# Patient Record
Sex: MALE | Employment: FULL TIME | URBAN - METROPOLITAN AREA
[De-identification: names, ages, dates, MRNs, and addresses within clinical notes are randomized per-mention and may not be internally consistent; named-entity substitution may affect disease eponyms.]

---

## 2017-03-17 ENCOUNTER — OFFICE VISIT (OUTPATIENT)
Dept: CARDIOLOGY CLINIC | Age: 57
End: 2017-03-17

## 2017-03-17 VITALS
SYSTOLIC BLOOD PRESSURE: 130 MMHG | BODY MASS INDEX: 34.69 KG/M2 | WEIGHT: 256.12 LBS | HEIGHT: 72 IN | HEART RATE: 95 BPM | DIASTOLIC BLOOD PRESSURE: 82 MMHG

## 2017-03-17 DIAGNOSIS — E78.5 HYPERLIPIDEMIA, UNSPECIFIED HYPERLIPIDEMIA TYPE: ICD-10-CM

## 2017-03-17 DIAGNOSIS — I10 ESSENTIAL HYPERTENSION: ICD-10-CM

## 2017-03-17 DIAGNOSIS — R94.39 ABNORMAL STRESS TEST: ICD-10-CM

## 2017-03-17 DIAGNOSIS — E10.9 TYPE 1 DIABETES MELLITUS WITHOUT COMPLICATION (HCC): ICD-10-CM

## 2017-03-17 DIAGNOSIS — I44.7 LBBB (LEFT BUNDLE BRANCH BLOCK): Primary | ICD-10-CM

## 2017-03-17 PROCEDURE — 99204 OFFICE O/P NEW MOD 45 MIN: CPT | Performed by: INTERNAL MEDICINE

## 2017-03-17 PROCEDURE — 93000 ELECTROCARDIOGRAM COMPLETE: CPT | Performed by: INTERNAL MEDICINE

## 2017-03-17 RX ORDER — LIRAGLUTIDE 6 MG/ML
1.8 INJECTION SUBCUTANEOUS DAILY
COMMUNITY
Start: 2017-03-15 | End: 2019-04-25 | Stop reason: SDUPTHER

## 2017-03-17 RX ORDER — FENOFIBRATE 160 MG/1
160 TABLET ORAL DAILY
COMMUNITY
Start: 2017-03-12 | End: 2019-04-25

## 2017-03-17 RX ORDER — INSULIN GLARGINE 100 [IU]/ML
45 INJECTION, SOLUTION SUBCUTANEOUS DAILY
COMMUNITY
Start: 2017-02-19 | End: 2019-04-25

## 2017-03-17 RX ORDER — PIOGLITAZONEHYDROCHLORIDE 30 MG/1
30 TABLET ORAL DAILY
COMMUNITY
Start: 2017-03-12 | End: 2019-04-25

## 2017-03-17 RX ORDER — NIFEDIPINE 60 MG/1
60 TABLET, EXTENDED RELEASE ORAL DAILY
COMMUNITY
Start: 2017-03-12 | End: 2019-04-25 | Stop reason: SDUPTHER

## 2017-03-17 RX ORDER — LISINOPRIL 40 MG/1
40 TABLET ORAL DAILY
COMMUNITY
Start: 2017-03-12 | End: 2019-04-25 | Stop reason: SDUPTHER

## 2017-03-17 RX ORDER — PEN NEEDLE, DIABETIC 32GX 5/32"
1 NEEDLE, DISPOSABLE MISCELLANEOUS DAILY
COMMUNITY
Start: 2017-03-13 | End: 2019-04-25 | Stop reason: SDUPTHER

## 2017-03-17 RX ORDER — SIMVASTATIN 20 MG
20 TABLET ORAL NIGHTLY
COMMUNITY
Start: 2017-03-12 | End: 2019-04-25 | Stop reason: SDUPTHER

## 2017-03-17 RX ORDER — CANAGLIFLOZIN 300 MG/1
300 TABLET, FILM COATED ORAL
COMMUNITY
Start: 2017-03-12 | End: 2019-04-25

## 2017-03-22 ENCOUNTER — TELEPHONE (OUTPATIENT)
Dept: CARDIOLOGY CLINIC | Age: 57
End: 2017-03-22

## 2017-05-05 PROBLEM — R94.31 ABNORMAL ECG: Status: ACTIVE | Noted: 2017-05-05

## 2017-05-05 PROBLEM — R94.39 ABNORMAL NUCLEAR STRESS TEST: Status: ACTIVE | Noted: 2017-05-05

## 2019-04-25 ENCOUNTER — OFFICE VISIT (OUTPATIENT)
Dept: INTERNAL MEDICINE CLINIC | Age: 59
End: 2019-04-25
Payer: COMMERCIAL

## 2019-04-25 VITALS
WEIGHT: 227 LBS | DIASTOLIC BLOOD PRESSURE: 88 MMHG | HEART RATE: 78 BPM | BODY MASS INDEX: 30.75 KG/M2 | HEIGHT: 72 IN | SYSTOLIC BLOOD PRESSURE: 134 MMHG

## 2019-04-25 DIAGNOSIS — E78.00 PURE HYPERCHOLESTEROLEMIA: ICD-10-CM

## 2019-04-25 DIAGNOSIS — R53.83 FATIGUE, UNSPECIFIED TYPE: ICD-10-CM

## 2019-04-25 DIAGNOSIS — N52.9 ERECTILE DYSFUNCTION, UNSPECIFIED ERECTILE DYSFUNCTION TYPE: ICD-10-CM

## 2019-04-25 DIAGNOSIS — Z79.4 TYPE 2 DIABETES MELLITUS WITH DIABETIC POLYNEUROPATHY, WITH LONG-TERM CURRENT USE OF INSULIN (HCC): Primary | ICD-10-CM

## 2019-04-25 DIAGNOSIS — E11.42 TYPE 2 DIABETES MELLITUS WITH DIABETIC POLYNEUROPATHY, WITH LONG-TERM CURRENT USE OF INSULIN (HCC): Primary | ICD-10-CM

## 2019-04-25 DIAGNOSIS — I25.10 CORONARY ARTERY DISEASE INVOLVING NATIVE HEART WITHOUT ANGINA PECTORIS, UNSPECIFIED VESSEL OR LESION TYPE: ICD-10-CM

## 2019-04-25 DIAGNOSIS — E78.2 MIXED HYPERLIPIDEMIA: ICD-10-CM

## 2019-04-25 DIAGNOSIS — I10 ESSENTIAL HYPERTENSION: ICD-10-CM

## 2019-04-25 PROCEDURE — 3017F COLORECTAL CA SCREEN DOC REV: CPT | Performed by: INTERNAL MEDICINE

## 2019-04-25 PROCEDURE — G8417 CALC BMI ABV UP PARAM F/U: HCPCS | Performed by: INTERNAL MEDICINE

## 2019-04-25 PROCEDURE — 1036F TOBACCO NON-USER: CPT | Performed by: INTERNAL MEDICINE

## 2019-04-25 PROCEDURE — 2022F DILAT RTA XM EVC RTNOPTHY: CPT | Performed by: INTERNAL MEDICINE

## 2019-04-25 PROCEDURE — G8599 NO ASA/ANTIPLAT THER USE RNG: HCPCS | Performed by: INTERNAL MEDICINE

## 2019-04-25 PROCEDURE — G8427 DOCREV CUR MEDS BY ELIG CLIN: HCPCS | Performed by: INTERNAL MEDICINE

## 2019-04-25 PROCEDURE — 3046F HEMOGLOBIN A1C LEVEL >9.0%: CPT | Performed by: INTERNAL MEDICINE

## 2019-04-25 PROCEDURE — 99204 OFFICE O/P NEW MOD 45 MIN: CPT | Performed by: INTERNAL MEDICINE

## 2019-04-25 RX ORDER — NIFEDIPINE 60 MG/1
60 TABLET, EXTENDED RELEASE ORAL DAILY
Qty: 90 TABLET | Refills: 0 | Status: SHIPPED | OUTPATIENT
Start: 2019-04-25 | End: 2019-12-23

## 2019-04-25 RX ORDER — LIRAGLUTIDE 6 MG/ML
1.8 INJECTION SUBCUTANEOUS DAILY
Qty: 90 PEN | Refills: 0 | Status: SHIPPED
Start: 2019-04-25 | End: 2020-08-13

## 2019-04-25 RX ORDER — SILDENAFIL CITRATE 20 MG/1
20 TABLET ORAL DAILY
Qty: 90 TABLET | Refills: 0 | Status: CANCELLED | OUTPATIENT
Start: 2019-04-25

## 2019-04-25 RX ORDER — SIMVASTATIN 20 MG
20 TABLET ORAL NIGHTLY
Qty: 90 TABLET | Refills: 0 | Status: SHIPPED | OUTPATIENT
Start: 2019-04-25 | End: 2019-07-25 | Stop reason: SDUPTHER

## 2019-04-25 RX ORDER — SILDENAFIL CITRATE 20 MG/1
20 TABLET ORAL DAILY PRN
COMMUNITY

## 2019-04-25 RX ORDER — LISINOPRIL 40 MG/1
40 TABLET ORAL DAILY
Qty: 90 TABLET | Refills: 0 | Status: SHIPPED | OUTPATIENT
Start: 2019-04-25 | End: 2019-09-24 | Stop reason: SDUPTHER

## 2019-04-25 RX ORDER — PEN NEEDLE, DIABETIC 32GX 5/32"
1 NEEDLE, DISPOSABLE MISCELLANEOUS DAILY
Qty: 200 EACH | Refills: 0 | Status: SHIPPED | OUTPATIENT
Start: 2019-04-25 | End: 2019-07-26 | Stop reason: SDUPTHER

## 2019-04-25 SDOH — HEALTH STABILITY: MENTAL HEALTH: HOW OFTEN DO YOU HAVE A DRINK CONTAINING ALCOHOL?: 2-3 TIMES A WEEK

## 2019-04-25 ASSESSMENT — ENCOUNTER SYMPTOMS
COUGH: 0
CHEST TIGHTNESS: 0
VOICE CHANGE: 0
VOMITING: 0
EYE PAIN: 0
RHINORRHEA: 0
TROUBLE SWALLOWING: 0
ABDOMINAL PAIN: 0
NAUSEA: 0
WHEEZING: 0
EYE DISCHARGE: 0
COLOR CHANGE: 0
BACK PAIN: 0

## 2019-04-25 ASSESSMENT — PATIENT HEALTH QUESTIONNAIRE - PHQ9
SUM OF ALL RESPONSES TO PHQ QUESTIONS 1-9: 0
2. FEELING DOWN, DEPRESSED OR HOPELESS: 0
1. LITTLE INTEREST OR PLEASURE IN DOING THINGS: 0
SUM OF ALL RESPONSES TO PHQ QUESTIONS 1-9: 0
SUM OF ALL RESPONSES TO PHQ9 QUESTIONS 1 & 2: 0

## 2019-04-25 NOTE — PROGRESS NOTES
Ruthann Arias  1960  male  66 y.o. SUBJECTIVE:       Chief Complaint   Patient presents with    Established New Doctor    Diabetes       HPI:  This is a new patient here today to establish care. History of diabetes mellitus for last 12 years. According to patient blood sugar is running about 130s. He have an appointment with the podiatrist in next few days. He have recently seen the eye doctor and got new glass. He was told to have no diabetic retinopathy. .  History of possible myocardial infarction in the past as well as abnormal stress test.  He was told that he did not need any heart cath. He have occasional palpitation and exertional dyspnea. History of very high cholesterol. He had some, compliance issue with taking medication regularly and following up with PCP. He is now taking his medication as prescribed now.       Past Medical History:   Diagnosis Date    Coronary artery disease involving native heart without angina pectoris 2019    Erectile dysfunction 2019    Hypertension     Mixed hyperlipidemia 2019    Neuropathy     Sleep apnea      Past Surgical History:   Procedure Laterality Date    FRACTURE SURGERY       Social History     Socioeconomic History    Marital status: Unknown     Spouse name: None    Number of children: 1    Years of education: None    Highest education level: None   Occupational History    Occupation: [employer]   Social Needs    Financial resource strain: None    Food insecurity:     Worry: None     Inability: None    Transportation needs:     Medical: None     Non-medical: None   Tobacco Use    Smoking status: Former Smoker     Packs/day: 1.00     Years: 34.00     Pack years: 34.00     Types: Cigarettes     Start date: 1978     Last attempt to quit: 1/3/2012     Years since quittin.3    Smokeless tobacco: Never Used   Substance and Sexual Activity    Alcohol use: Yes     Frequency: 2-3 times a week     Comment: socially    Drug use: No    Sexual activity: Yes     Partners: Female   Lifestyle    Physical activity:     Days per week: None     Minutes per session: None    Stress: None   Relationships    Social connections:     Talks on phone: None     Gets together: None     Attends Episcopal service: None     Active member of club or organization: None     Attends meetings of clubs or organizations: None     Relationship status: None    Intimate partner violence:     Fear of current or ex partner: None     Emotionally abused: None     Physically abused: None     Forced sexual activity: None   Other Topics Concern    None   Social History Narrative    Son lives in [place]-9year old--fighting for visitation rights     Family History   Problem Relation Age of Onset    Arthritis Father     Cancer Father     Diabetes Father        Review of Systems   Constitutional: Negative for appetite change, chills, fever and unexpected weight change. HENT: Negative for congestion, rhinorrhea, trouble swallowing and voice change. Eyes: Negative for pain and discharge. Respiratory: Negative for cough, chest tightness and wheezing. Cardiovascular: Negative for palpitations. Gastrointestinal: Negative for abdominal pain, nausea and vomiting. Endocrine: Negative for cold intolerance and heat intolerance. Genitourinary: Positive for frequency. Negative for dysuria, flank pain and hematuria. Musculoskeletal: Negative for back pain, joint swelling, neck pain and neck stiffness. Skin: Negative for color change and rash. Neurological: Negative for syncope, light-headedness and headaches. Hematological: Negative for adenopathy. Does not bruise/bleed easily. Psychiatric/Behavioral: Positive for decreased concentration and sleep disturbance. The patient is not nervous/anxious.         OBJECTIVE:  Pulse Readings from Last 4 Encounters:   04/25/19 78   03/17/17 95     Wt Readings from Last 4 Encounters:   04/25/19 227 lb (103 kg) PANEL; Future  -     MICROALBUMIN / CREATININE URINE RATIO; Future  -     Diabetic Foot Exam  -     TSH with Reflex; Future  -     CBC; Future    Pure hypercholesterolemia  -     simvastatin (ZOCOR) 20 MG tablet; Take 1 tablet by mouth nightly    Essential hypertension  -     NIFEdipine (PROCARDIA XL) 60 MG extended release tablet; Take 1 tablet by mouth daily  -     TSH with Reflex; Future    Coronary artery disease involving native heart without angina pectoris, unspecified vessel or lesion type  -     Ambulatory referral to Cardiology    Erectile dysfunction, unspecified erectile dysfunction type    Fatigue, unspecified type  -     VITAMIN B12 & FOLATE; Future  -     VITAMIN D 25 HYDROXY; Future    Mixed hyperlipidemia      I have recommended him to do fasting lab work within next few days. I explained to patient until his active coronary artery disease is ruled out  I will not be able to refill his Revatio.         Orders Placed This Encounter   Procedures    HEMOGLOBIN A1C     Standing Status:   Future     Standing Expiration Date:   4/25/2020    COMPREHENSIVE METABOLIC PANEL     Standing Status:   Future     Standing Expiration Date:   4/25/2020    LIPID PANEL     Standing Status:   Future     Standing Expiration Date:   4/25/2020     Order Specific Question:   Is Patient Fasting?/# of Hours     Answer:   fasting    MICROALBUMIN / CREATININE URINE RATIO     Standing Status:   Future     Standing Expiration Date:   4/25/2020    TSH with Reflex     Standing Status:   Future     Standing Expiration Date:   4/25/2020    VITAMIN B12 & FOLATE     Standing Status:   Future     Standing Expiration Date:   4/25/2020    VITAMIN D 25 HYDROXY     Standing Status:   Future     Standing Expiration Date:   4/25/2020    CBC     Standing Status:   Future     Standing Expiration Date:   4/25/2020    Ambulatory referral to Cardiology     Referral Priority:   Routine     Referral Type:   Consult for Advice and Opinion

## 2019-04-30 NOTE — PROGRESS NOTES
[facility]  Follow Up     Referring Provider:  Sharri Herrera MD     Chief Complaint   Patient presents with    Follow-up    Coronary Artery Disease    Hypertension        History of Present Illness:  Mr Grabiel Bowers is seen today for routine follow up. He was initially self referred for LBBB. At the time he had been attempting to get life insurance, but has been denied due to an abnormal ekg and abnormal stress test. He had had a stress test at [facility], and was under the assumption that it was ok. He also has a history of hypertension, hyperlipidemia, and diabetes. Today he has no complaints. At his last visit, a cardiac cath was ordered. He did not have this done. He states he saw a different cardiologist (at [facility]) in the interim who \"told him it was not needed and he could wait a year\". He never followed up. He states \"I don't make my appointments\". He states he has lost 45 lbs. in the last few months. States he has occasional shortness of breath, mostly in the mornings. He had one episode of chest pain approximately a year ago, after an argument. Denies further complaints since. He is active and walks 8-12 miles a day at his job. Past Medical History:   has a past medical history of Coronary artery disease involving native heart without angina pectoris, Erectile dysfunction, Hypertension, Mixed hyperlipidemia, Neuropathy, and Sleep apnea. Surgical History:   has a past surgical history that includes fracture surgery. Social History:   reports that he quit smoking about 7 years ago. His smoking use included cigarettes. He started smoking about 41 years ago. He has a 34.00 pack-year smoking history. He has never used smokeless tobacco. He reports that he drinks alcohol. He reports that he does not use drugs. Family History:  family history includes Arthritis in his father; Cancer in his father; Diabetes in his father.      Home Medications:  Prior to Admission medications    Medication Sig Start Date End Date Taking? Authorizing Provider   sildenafil (REVATIO) 20 MG tablet Take 20 mg by mouth daily   Yes Historical Provider, MD   VICTOZA 18 MG/3ML SOPN SC injection Inject 1.8 mg into the skin daily 4/25/19  Yes Des Story MD   simvastatin (ZOCOR) 20 MG tablet Take 1 tablet by mouth nightly 4/25/19  Yes Des Story MD   NIFEdipine (PROCARDIA XL) 60 MG extended release tablet Take 1 tablet by mouth daily 4/25/19  Yes Des Story MD   metFORMIN (GLUCOPHAGE) 1000 MG tablet Take 1 tablet by mouth 2 times daily (with meals) 4/25/19  Yes Des Story MD   lisinopril (PRINIVIL;ZESTRIL) 40 MG tablet Take 1 tablet by mouth daily 4/25/19  Yes Des Story MD   insulin glargine (BASAGLAR KWIKPEN) 100 UNIT/ML injection pen Inject 40 Units into the skin every morning 4/25/19  Yes Des Story MD   BD PEN NEEDLE CAMDEN U/F 32G X 4 MM MISC Inject 1 each into the skin daily 4/25/19  Yes Des Story MD   canagliflozin (INVOKANA) 300 MG TABS tablet Take 1 tablet by mouth every morning (before breakfast) 5/1/19   M Rosa Wilson MD   pioglitazone (ACTOS) 30 MG tablet Take 1 tablet by mouth daily 5/1/19   Des Story MD        Allergies:  Patient has no known allergies. Review of Systems:   · Constitutional: there has been no unanticipated weight loss. There's been no change in energy level, sleep pattern, or activity level. · Eyes: No visual changes or diplopia. No scleral icterus. · ENT: No Headaches, hearing loss or vertigo. No mouth sores or sore throat. · Cardiovascular: Reviewed in HPI  · Respiratory: No cough or wheezing, no sputum production. No hematemesis. · Gastrointestinal: No abdominal pain, appetite loss, blood in stools. No change in bowel or bladder habits. · Genitourinary: No dysuria, trouble voiding, or hematuria. · Musculoskeletal:  No gait disturbance, weakness or joint complaints.   · Integumentary: No rash or

## 2019-05-01 ENCOUNTER — TELEPHONE (OUTPATIENT)
Dept: INTERNAL MEDICINE CLINIC | Age: 59
End: 2019-05-01

## 2019-05-01 ENCOUNTER — OFFICE VISIT (OUTPATIENT)
Dept: CARDIOLOGY CLINIC | Age: 59
End: 2019-05-01
Payer: COMMERCIAL

## 2019-05-01 VITALS
WEIGHT: 218.6 LBS | HEART RATE: 92 BPM | HEIGHT: 72 IN | SYSTOLIC BLOOD PRESSURE: 132 MMHG | DIASTOLIC BLOOD PRESSURE: 90 MMHG | BODY MASS INDEX: 29.61 KG/M2

## 2019-05-01 DIAGNOSIS — E11.42 TYPE 2 DIABETES MELLITUS WITH DIABETIC POLYNEUROPATHY, WITH LONG-TERM CURRENT USE OF INSULIN (HCC): Primary | ICD-10-CM

## 2019-05-01 DIAGNOSIS — I25.10 CORONARY ARTERY DISEASE INVOLVING NATIVE HEART WITHOUT ANGINA PECTORIS, UNSPECIFIED VESSEL OR LESION TYPE: ICD-10-CM

## 2019-05-01 DIAGNOSIS — Z79.4 TYPE 2 DIABETES MELLITUS WITH DIABETIC POLYNEUROPATHY, WITH LONG-TERM CURRENT USE OF INSULIN (HCC): Primary | ICD-10-CM

## 2019-05-01 DIAGNOSIS — E78.2 MIXED HYPERLIPIDEMIA: ICD-10-CM

## 2019-05-01 DIAGNOSIS — Z79.4 TYPE 2 DIABETES MELLITUS WITH DIABETIC POLYNEUROPATHY, WITH LONG-TERM CURRENT USE OF INSULIN (HCC): ICD-10-CM

## 2019-05-01 DIAGNOSIS — E11.42 TYPE 2 DIABETES MELLITUS WITH DIABETIC POLYNEUROPATHY, WITH LONG-TERM CURRENT USE OF INSULIN (HCC): ICD-10-CM

## 2019-05-01 DIAGNOSIS — R41.3 MEMORY DEFICIT: ICD-10-CM

## 2019-05-01 DIAGNOSIS — I10 ESSENTIAL HYPERTENSION: Primary | ICD-10-CM

## 2019-05-01 PROCEDURE — G8599 NO ASA/ANTIPLAT THER USE RNG: HCPCS | Performed by: INTERNAL MEDICINE

## 2019-05-01 PROCEDURE — 3017F COLORECTAL CA SCREEN DOC REV: CPT | Performed by: INTERNAL MEDICINE

## 2019-05-01 PROCEDURE — 3046F HEMOGLOBIN A1C LEVEL >9.0%: CPT | Performed by: INTERNAL MEDICINE

## 2019-05-01 PROCEDURE — G8417 CALC BMI ABV UP PARAM F/U: HCPCS | Performed by: INTERNAL MEDICINE

## 2019-05-01 PROCEDURE — G8427 DOCREV CUR MEDS BY ELIG CLIN: HCPCS | Performed by: INTERNAL MEDICINE

## 2019-05-01 PROCEDURE — 2022F DILAT RTA XM EVC RTNOPTHY: CPT | Performed by: INTERNAL MEDICINE

## 2019-05-01 PROCEDURE — 99214 OFFICE O/P EST MOD 30 MIN: CPT | Performed by: INTERNAL MEDICINE

## 2019-05-01 PROCEDURE — 1036F TOBACCO NON-USER: CPT | Performed by: INTERNAL MEDICINE

## 2019-05-01 RX ORDER — PIOGLITAZONEHYDROCHLORIDE 30 MG/1
30 TABLET ORAL DAILY
Qty: 90 TABLET | Refills: 0 | Status: CANCELLED | OUTPATIENT
Start: 2019-05-01

## 2019-05-01 RX ORDER — PIOGLITAZONEHYDROCHLORIDE 30 MG/1
30 TABLET ORAL DAILY
Qty: 90 TABLET | Refills: 0 | Status: SHIPPED | OUTPATIENT
Start: 2019-05-01 | End: 2019-05-02 | Stop reason: SDUPTHER

## 2019-05-01 NOTE — TELEPHONE ENCOUNTER
recvd refill request for invokana--not on med list. Uses [facility]--needs to be faxed. Spoke to pt he states that he is taking invokana 300 mg and actos 30 (denied taking on 4-25-19. He is asking for neurology referral--memory issues.  \"not normal\"

## 2019-05-01 NOTE — TELEPHONE ENCOUNTER
He haven't done any of his lab work as ordered during his visit. Please call patient and advise compliance with my order.

## 2019-05-01 NOTE — TELEPHONE ENCOUNTER
Pt needs scripts for invokana & actos [place] [facility] [place] not [facility]. Pt states Ezequiel called and was told that he was not at pt here.

## 2019-05-02 DIAGNOSIS — E11.42 TYPE 2 DIABETES MELLITUS WITH DIABETIC POLYNEUROPATHY, WITH LONG-TERM CURRENT USE OF INSULIN (HCC): ICD-10-CM

## 2019-05-02 DIAGNOSIS — Z79.4 TYPE 2 DIABETES MELLITUS WITH DIABETIC POLYNEUROPATHY, WITH LONG-TERM CURRENT USE OF INSULIN (HCC): ICD-10-CM

## 2019-05-02 RX ORDER — PIOGLITAZONEHYDROCHLORIDE 30 MG/1
30 TABLET ORAL DAILY
Qty: 30 TABLET | Refills: 2 | Status: SHIPPED | OUTPATIENT
Start: 2019-05-02 | End: 2019-07-25 | Stop reason: SDUPTHER

## 2019-05-30 ENCOUNTER — TELEPHONE (OUTPATIENT)
Dept: INTERNAL MEDICINE CLINIC | Age: 59
End: 2019-05-30

## 2019-05-30 RX ORDER — OMEPRAZOLE 40 MG/1
40 CAPSULE, DELAYED RELEASE ORAL
Qty: 90 CAPSULE | Refills: 1 | Status: SHIPPED | OUTPATIENT
Start: 2019-05-30 | End: 2019-09-24 | Stop reason: SDUPTHER

## 2019-05-30 NOTE — TELEPHONE ENCOUNTER
I have reviewed cardiologist's note. He have history of coronary artery disease  He have not had suggested test by cardiologist Dr. Anola Essex- like angiogram  I will not be able to refill his Viagra.   OK with prilosec

## 2019-06-04 DIAGNOSIS — E11.42 TYPE 2 DIABETES MELLITUS WITH DIABETIC POLYNEUROPATHY, WITH LONG-TERM CURRENT USE OF INSULIN (HCC): ICD-10-CM

## 2019-06-04 DIAGNOSIS — Z79.4 TYPE 2 DIABETES MELLITUS WITH DIABETIC POLYNEUROPATHY, WITH LONG-TERM CURRENT USE OF INSULIN (HCC): ICD-10-CM

## 2019-06-05 DIAGNOSIS — Z79.4 TYPE 2 DIABETES MELLITUS WITH DIABETIC POLYNEUROPATHY, WITH LONG-TERM CURRENT USE OF INSULIN (HCC): ICD-10-CM

## 2019-06-05 DIAGNOSIS — E11.42 TYPE 2 DIABETES MELLITUS WITH DIABETIC POLYNEUROPATHY, WITH LONG-TERM CURRENT USE OF INSULIN (HCC): ICD-10-CM

## 2019-06-05 RX ORDER — SILDENAFIL CITRATE 20 MG/1
20 TABLET ORAL DAILY
Qty: 90 TABLET | Refills: 1 | OUTPATIENT
Start: 2019-06-05

## 2019-06-05 NOTE — TELEPHONE ENCOUNTER
This Medicine will not be refilled by me.   History of abnormal stress test  He should follow recommendation by Dr. Rachel Hawk for further cardiac workup

## 2019-06-10 ENCOUNTER — TELEPHONE (OUTPATIENT)
Dept: INTERNAL MEDICINE CLINIC | Age: 59
End: 2019-06-10

## 2019-07-26 DIAGNOSIS — Z79.4 TYPE 2 DIABETES MELLITUS WITH DIABETIC POLYNEUROPATHY, WITH LONG-TERM CURRENT USE OF INSULIN (HCC): ICD-10-CM

## 2019-07-26 DIAGNOSIS — E11.42 TYPE 2 DIABETES MELLITUS WITH DIABETIC POLYNEUROPATHY, WITH LONG-TERM CURRENT USE OF INSULIN (HCC): ICD-10-CM

## 2019-07-26 RX ORDER — PEN NEEDLE, DIABETIC 32GX 5/32"
1 NEEDLE, DISPOSABLE MISCELLANEOUS DAILY
Qty: 90 EACH | Refills: 1 | Status: SHIPPED | OUTPATIENT
Start: 2019-07-26 | End: 2019-11-27 | Stop reason: SDUPTHER

## 2019-09-24 DIAGNOSIS — Z79.4 TYPE 2 DIABETES MELLITUS WITH DIABETIC POLYNEUROPATHY, WITH LONG-TERM CURRENT USE OF INSULIN (HCC): ICD-10-CM

## 2019-09-24 DIAGNOSIS — E11.42 TYPE 2 DIABETES MELLITUS WITH DIABETIC POLYNEUROPATHY, WITH LONG-TERM CURRENT USE OF INSULIN (HCC): ICD-10-CM

## 2019-09-24 RX ORDER — LISINOPRIL 40 MG/1
40 TABLET ORAL DAILY
Qty: 90 TABLET | Refills: 0 | Status: SHIPPED | OUTPATIENT
Start: 2019-09-24 | End: 2019-12-23

## 2019-09-24 RX ORDER — OMEPRAZOLE 40 MG/1
40 CAPSULE, DELAYED RELEASE ORAL
Qty: 90 CAPSULE | Refills: 0 | Status: SHIPPED | OUTPATIENT
Start: 2019-09-24 | End: 2019-12-23

## 2019-09-24 RX ORDER — CANAGLIFLOZIN 300 MG/1
TABLET, FILM COATED ORAL
Qty: 90 TABLET | Refills: 0 | Status: SHIPPED | OUTPATIENT
Start: 2019-09-24 | End: 2019-12-23

## 2019-09-24 RX ORDER — PIOGLITAZONEHYDROCHLORIDE 30 MG/1
30 TABLET ORAL DAILY
Qty: 90 TABLET | Refills: 0 | Status: SHIPPED | OUTPATIENT
Start: 2019-09-24 | End: 2019-12-02 | Stop reason: HOSPADM

## 2019-11-22 DIAGNOSIS — E11.42 TYPE 2 DIABETES MELLITUS WITH DIABETIC POLYNEUROPATHY, WITH LONG-TERM CURRENT USE OF INSULIN (HCC): ICD-10-CM

## 2019-11-22 DIAGNOSIS — Z79.4 TYPE 2 DIABETES MELLITUS WITH DIABETIC POLYNEUROPATHY, WITH LONG-TERM CURRENT USE OF INSULIN (HCC): ICD-10-CM

## 2019-11-27 DIAGNOSIS — E11.42 TYPE 2 DIABETES MELLITUS WITH DIABETIC POLYNEUROPATHY, WITH LONG-TERM CURRENT USE OF INSULIN (HCC): ICD-10-CM

## 2019-11-27 DIAGNOSIS — Z79.4 TYPE 2 DIABETES MELLITUS WITH DIABETIC POLYNEUROPATHY, WITH LONG-TERM CURRENT USE OF INSULIN (HCC): ICD-10-CM

## 2019-11-27 RX ORDER — PEN NEEDLE, DIABETIC 32GX 5/32"
1 NEEDLE, DISPOSABLE MISCELLANEOUS DAILY
Qty: 90 EACH | Refills: 3 | Status: SHIPPED | OUTPATIENT
Start: 2019-11-27 | End: 2020-04-02 | Stop reason: SDUPTHER

## 2019-12-02 ENCOUNTER — OFFICE VISIT (OUTPATIENT)
Dept: INTERNAL MEDICINE CLINIC | Age: 59
End: 2019-12-02
Payer: COMMERCIAL

## 2019-12-02 VITALS
BODY MASS INDEX: 28.71 KG/M2 | SYSTOLIC BLOOD PRESSURE: 136 MMHG | WEIGHT: 212 LBS | HEIGHT: 72 IN | DIASTOLIC BLOOD PRESSURE: 76 MMHG | HEART RATE: 104 BPM

## 2019-12-02 DIAGNOSIS — E11.42 TYPE 2 DIABETES MELLITUS WITH DIABETIC POLYNEUROPATHY, WITH LONG-TERM CURRENT USE OF INSULIN (HCC): Primary | ICD-10-CM

## 2019-12-02 DIAGNOSIS — Z79.4 TYPE 2 DIABETES MELLITUS WITH DIABETIC POLYNEUROPATHY, WITH LONG-TERM CURRENT USE OF INSULIN (HCC): Primary | ICD-10-CM

## 2019-12-02 DIAGNOSIS — I10 ESSENTIAL HYPERTENSION: ICD-10-CM

## 2019-12-02 DIAGNOSIS — Z87.891 PERSONAL HISTORY OF TOBACCO USE: ICD-10-CM

## 2019-12-02 DIAGNOSIS — E53.8 B12 DEFICIENCY: Primary | ICD-10-CM

## 2019-12-02 DIAGNOSIS — R53.83 FATIGUE, UNSPECIFIED TYPE: ICD-10-CM

## 2019-12-02 DIAGNOSIS — E11.42 TYPE 2 DIABETES MELLITUS WITH DIABETIC POLYNEUROPATHY, WITH LONG-TERM CURRENT USE OF INSULIN (HCC): ICD-10-CM

## 2019-12-02 DIAGNOSIS — Z12.11 COLON CANCER SCREENING: ICD-10-CM

## 2019-12-02 DIAGNOSIS — Z79.4 TYPE 2 DIABETES MELLITUS WITH DIABETIC POLYNEUROPATHY, WITH LONG-TERM CURRENT USE OF INSULIN (HCC): ICD-10-CM

## 2019-12-02 DIAGNOSIS — E78.2 MIXED HYPERLIPIDEMIA: ICD-10-CM

## 2019-12-02 PROBLEM — E55.9 VITAMIN D DEFICIENCY: Status: ACTIVE | Noted: 2019-12-02

## 2019-12-02 LAB
A/G RATIO: 1.8 (ref 1.1–2.2)
ALBUMIN SERPL-MCNC: 4.6 G/DL (ref 3.4–5)
ALP BLD-CCNC: 50 U/L (ref 40–129)
ALT SERPL-CCNC: 22 U/L (ref 10–40)
ANION GAP SERPL CALCULATED.3IONS-SCNC: 22 MMOL/L (ref 3–16)
AST SERPL-CCNC: 29 U/L (ref 15–37)
BILIRUB SERPL-MCNC: <0.2 MG/DL (ref 0–1)
BUN BLDV-MCNC: 15 MG/DL (ref 7–20)
CALCIUM SERPL-MCNC: 9.6 MG/DL (ref 8.3–10.6)
CHLORIDE BLD-SCNC: 99 MMOL/L (ref 99–110)
CHOLESTEROL, TOTAL: 200 MG/DL (ref 0–199)
CO2: 21 MMOL/L (ref 21–32)
CREAT SERPL-MCNC: 1 MG/DL (ref 0.9–1.3)
ESTIMATED AVERAGE GLUCOSE: 162.8 MG/DL
FOLATE: 4.46 NG/ML (ref 4.78–24.2)
GFR AFRICAN AMERICAN: >60
GFR NON-AFRICAN AMERICAN: >60
GLOBULIN: 2.5 G/DL
GLUCOSE BLD-MCNC: 110 MG/DL (ref 70–99)
HBA1C MFR BLD: 7.3 %
HCT VFR BLD CALC: 38.6 % (ref 40.5–52.5)
HDLC SERPL-MCNC: 83 MG/DL (ref 40–60)
HEMOGLOBIN: 12.8 G/DL (ref 13.5–17.5)
LDL CHOLESTEROL CALCULATED: ABNORMAL MG/DL
LDL CHOLESTEROL DIRECT: 49 MG/DL
MCH RBC QN AUTO: 32.5 PG (ref 26–34)
MCHC RBC AUTO-ENTMCNC: 33.1 G/DL (ref 31–36)
MCV RBC AUTO: 98 FL (ref 80–100)
PDW BLD-RTO: 13.7 % (ref 12.4–15.4)
PLATELET # BLD: 235 K/UL (ref 135–450)
PMV BLD AUTO: 7.9 FL (ref 5–10.5)
POTASSIUM SERPL-SCNC: 4 MMOL/L (ref 3.5–5.1)
RBC # BLD: 3.94 M/UL (ref 4.2–5.9)
SODIUM BLD-SCNC: 142 MMOL/L (ref 136–145)
TOTAL PROTEIN: 7.1 G/DL (ref 6.4–8.2)
TRIGL SERPL-MCNC: 619 MG/DL (ref 0–150)
TSH REFLEX: 2.44 UIU/ML (ref 0.27–4.2)
VITAMIN B-12: 161 PG/ML (ref 211–911)
VITAMIN D 25-HYDROXY: 5.3 NG/ML
VLDLC SERPL CALC-MCNC: ABNORMAL MG/DL
WBC # BLD: 5.7 K/UL (ref 4–11)

## 2019-12-02 PROCEDURE — 3046F HEMOGLOBIN A1C LEVEL >9.0%: CPT | Performed by: INTERNAL MEDICINE

## 2019-12-02 PROCEDURE — 99214 OFFICE O/P EST MOD 30 MIN: CPT | Performed by: INTERNAL MEDICINE

## 2019-12-02 PROCEDURE — G8599 NO ASA/ANTIPLAT THER USE RNG: HCPCS | Performed by: INTERNAL MEDICINE

## 2019-12-02 PROCEDURE — G8427 DOCREV CUR MEDS BY ELIG CLIN: HCPCS | Performed by: INTERNAL MEDICINE

## 2019-12-02 PROCEDURE — 2022F DILAT RTA XM EVC RTNOPTHY: CPT | Performed by: INTERNAL MEDICINE

## 2019-12-02 PROCEDURE — G8484 FLU IMMUNIZE NO ADMIN: HCPCS | Performed by: INTERNAL MEDICINE

## 2019-12-02 PROCEDURE — 3017F COLORECTAL CA SCREEN DOC REV: CPT | Performed by: INTERNAL MEDICINE

## 2019-12-02 PROCEDURE — 1036F TOBACCO NON-USER: CPT | Performed by: INTERNAL MEDICINE

## 2019-12-02 PROCEDURE — G0296 VISIT TO DETERM LDCT ELIG: HCPCS | Performed by: INTERNAL MEDICINE

## 2019-12-02 PROCEDURE — G8417 CALC BMI ABV UP PARAM F/U: HCPCS | Performed by: INTERNAL MEDICINE

## 2019-12-02 RX ORDER — CHOLECALCIFEROL (VITAMIN D3) 1250 MCG
CAPSULE ORAL
Qty: 4 CAPSULE | Refills: 5 | Status: SHIPPED | OUTPATIENT
Start: 2019-12-02 | End: 2020-01-15 | Stop reason: SDUPTHER

## 2019-12-02 RX ORDER — FOLIC ACID 1 MG/1
1 TABLET ORAL DAILY
Qty: 30 TABLET | Refills: 5 | Status: SHIPPED | OUTPATIENT
Start: 2019-12-02 | End: 2020-07-06 | Stop reason: SDUPTHER

## 2019-12-02 RX ORDER — CYANOCOBALAMIN 1000 UG/ML
INJECTION INTRAMUSCULAR; SUBCUTANEOUS
Qty: 4 ML | Refills: 5 | Status: SHIPPED
Start: 2019-12-02 | End: 2020-08-13

## 2019-12-02 ASSESSMENT — ENCOUNTER SYMPTOMS
VOMITING: 0
ABDOMINAL PAIN: 0
VOICE CHANGE: 0
NAUSEA: 0
TROUBLE SWALLOWING: 0
PHOTOPHOBIA: 0

## 2020-01-14 ENCOUNTER — TELEPHONE (OUTPATIENT)
Dept: INTERNAL MEDICINE CLINIC | Age: 60
End: 2020-01-14

## 2020-01-15 ENCOUNTER — TELEPHONE (OUTPATIENT)
Dept: INTERNAL MEDICINE CLINIC | Age: 60
End: 2020-01-15

## 2020-01-15 RX ORDER — CHOLECALCIFEROL (VITAMIN D3) 1250 MCG
CAPSULE ORAL
Qty: 4 CAPSULE | Refills: 5 | Status: SHIPPED | OUTPATIENT
Start: 2020-01-15

## 2020-02-17 ENCOUNTER — TELEPHONE (OUTPATIENT)
Dept: INTERNAL MEDICINE CLINIC | Age: 60
End: 2020-02-17

## 2020-04-02 RX ORDER — NIFEDIPINE 60 MG/1
60 TABLET, EXTENDED RELEASE ORAL DAILY
Qty: 90 TABLET | Refills: 1 | Status: SHIPPED | OUTPATIENT
Start: 2020-04-02 | End: 2021-01-08

## 2020-04-02 RX ORDER — LISINOPRIL 40 MG/1
40 TABLET ORAL DAILY
Qty: 90 TABLET | Refills: 1 | Status: SHIPPED | OUTPATIENT
Start: 2020-04-02 | End: 2021-01-08

## 2020-04-02 RX ORDER — CANAGLIFLOZIN 300 MG/1
TABLET, FILM COATED ORAL
Qty: 90 TABLET | Refills: 1 | Status: SHIPPED | OUTPATIENT
Start: 2020-04-02 | End: 2021-01-08

## 2020-04-02 RX ORDER — PEN NEEDLE, DIABETIC 32GX 5/32"
1 NEEDLE, DISPOSABLE MISCELLANEOUS DAILY
Qty: 90 EACH | Refills: 1 | Status: SHIPPED | OUTPATIENT
Start: 2020-04-02 | End: 2021-02-09

## 2020-04-02 RX ORDER — INSULIN GLARGINE 100 [IU]/ML
20 INJECTION, SOLUTION SUBCUTANEOUS NIGHTLY
Qty: 5 PEN | Refills: 5 | Status: SHIPPED
Start: 2020-04-02 | End: 2020-07-01 | Stop reason: ALTCHOICE

## 2020-04-02 RX ORDER — OMEPRAZOLE 40 MG/1
40 CAPSULE, DELAYED RELEASE ORAL
Qty: 90 CAPSULE | Refills: 1 | Status: SHIPPED | OUTPATIENT
Start: 2020-04-02 | End: 2021-01-08

## 2020-04-02 NOTE — TELEPHONE ENCOUNTER
Patient requesting a medication refill.   Medication: Cholecalciferol (VITAMIN D3) 1.25 MG (03818 UT) CAPS   omeprazole (PRILOSEC) 40 MG delayed release capsule   NIFEdipine (PROCARDIA XL) 60 MG extended release tablet   INVOKANA 300 MG TABS tablet   lisinopril (PRINIVIL;ZESTRIL) 40 MG tablet   insulin glargine (BASAGLAR KWIKPEN) 100 UNIT/ML injection pen   metFORMIN (GLUCOPHAGE) 1000 MG tablet   Pharmacy: Patient [address] [place] [address] [phone] Maia Meigs [phone]  Last office visit: 12/02/19  Next office visit: 4/9/2020

## 2020-05-06 ENCOUNTER — TELEPHONE (OUTPATIENT)
Dept: INTERNAL MEDICINE CLINIC | Age: 60
End: 2020-05-06

## 2020-05-22 NOTE — TELEPHONE ENCOUNTER
Received APPROVAL for Invokana 300MG tablets starting 04/14/2020 through 05/14/2021. Please notify patient. Thank you.

## 2020-06-29 NOTE — TELEPHONE ENCOUNTER
Submitted PA for [employer] 100UNIT/ML pen-injectors, Key: [id number]. Medication is DENIED. Will scan denial letter once received. Please notify patient. Thank you.

## 2020-07-01 RX ORDER — INSULIN DETEMIR 100 [IU]/ML
20 INJECTION, SOLUTION SUBCUTANEOUS NIGHTLY
Qty: 5 PEN | Refills: 0 | Status: SHIPPED | OUTPATIENT
Start: 2020-07-01 | End: 2020-08-07

## 2020-07-06 RX ORDER — FOLIC ACID 1 MG/1
1 TABLET ORAL DAILY
Qty: 30 TABLET | Refills: 5 | Status: SHIPPED | OUTPATIENT
Start: 2020-07-06 | End: 2021-02-09

## 2020-07-06 NOTE — TELEPHONE ENCOUNTER
Patient requesting a medication refill.   Medication: folic acid (FOLVITE) 1 MG tablet & Levimer (already sent- please inform Pt)  [facility]  Last office visit: 12/2/19  Next office visit: Visit date not found

## 2020-08-13 ENCOUNTER — TELEPHONE (OUTPATIENT)
Dept: INTERNAL MEDICINE CLINIC | Age: 60
End: 2020-08-13

## 2020-08-13 ENCOUNTER — OFFICE VISIT (OUTPATIENT)
Dept: INTERNAL MEDICINE CLINIC | Age: 60
End: 2020-08-13
Payer: COMMERCIAL

## 2020-08-13 VITALS
DIASTOLIC BLOOD PRESSURE: 86 MMHG | BODY MASS INDEX: 29.66 KG/M2 | HEART RATE: 96 BPM | HEIGHT: 72 IN | WEIGHT: 219 LBS | TEMPERATURE: 99.8 F | SYSTOLIC BLOOD PRESSURE: 136 MMHG

## 2020-08-13 PROCEDURE — G8417 CALC BMI ABV UP PARAM F/U: HCPCS | Performed by: INTERNAL MEDICINE

## 2020-08-13 PROCEDURE — G8428 CUR MEDS NOT DOCUMENT: HCPCS | Performed by: INTERNAL MEDICINE

## 2020-08-13 PROCEDURE — 2022F DILAT RTA XM EVC RTNOPTHY: CPT | Performed by: INTERNAL MEDICINE

## 2020-08-13 PROCEDURE — 1036F TOBACCO NON-USER: CPT | Performed by: INTERNAL MEDICINE

## 2020-08-13 PROCEDURE — 3017F COLORECTAL CA SCREEN DOC REV: CPT | Performed by: INTERNAL MEDICINE

## 2020-08-13 PROCEDURE — 3046F HEMOGLOBIN A1C LEVEL >9.0%: CPT | Performed by: INTERNAL MEDICINE

## 2020-08-13 PROCEDURE — 99213 OFFICE O/P EST LOW 20 MIN: CPT | Performed by: INTERNAL MEDICINE

## 2020-08-13 RX ORDER — SILDENAFIL CITRATE 20 MG/1
20 TABLET ORAL DAILY PRN
Qty: 30 TABLET | Refills: 0 | Status: CANCELLED | OUTPATIENT
Start: 2020-08-13

## 2020-08-13 RX ORDER — AMOXICILLIN 500 MG/1
500 CAPSULE ORAL 3 TIMES DAILY
Qty: 30 CAPSULE | Refills: 0 | Status: SHIPPED | OUTPATIENT
Start: 2020-08-13 | End: 2020-08-23

## 2020-08-13 ASSESSMENT — ENCOUNTER SYMPTOMS
WHEEZING: 0
TROUBLE SWALLOWING: 0
VOICE CHANGE: 0
SHORTNESS OF BREATH: 0

## 2020-08-13 NOTE — PROGRESS NOTES
Carly Borne  1960  male  64 y.o. SUBJECTIVE:       Chief Complaint   Patient presents with    Oral Swelling     gum very senstitive    Diabetes     not taking victoza. 200 fasting       HPI:  Same-day visit. Patient is complaining of upper jaw gum pain as well as redness and swelling for the last 1 week. History of uncontrolled diabetes. Last seen dentist about 10 years ago. Denies fever chills nausea vomiting systemic symptoms. Did not try any medication to relieve the symptoms. He is not taking his diabetic medication as prescribed.     Past Medical History:   Diagnosis Date    Coronary artery disease involving native heart without angina pectoris 2019    Erectile dysfunction 2019    Hypertension     Mixed hyperlipidemia 2019    Neuropathy     Sleep apnea      Past Surgical History:   Procedure Laterality Date    FRACTURE SURGERY       Social History     Socioeconomic History    Marital status: Unknown     Spouse name: None    Number of children: 1    Years of education: None    Highest education level: None   Occupational History    Occupation: [employer]   Social Needs    Financial resource strain: None    Food insecurity     Worry: None     Inability: None    Transportation needs     Medical: None     Non-medical: None   Tobacco Use    Smoking status: Former Smoker     Packs/day: 1.00     Years: 34.00     Pack years: 34.00     Types: Cigarettes     Start date: 1978     Last attempt to quit: 1/3/2012     Years since quittin.6    Smokeless tobacco: Never Used   Substance and Sexual Activity    Alcohol use: Yes     Frequency: 2-3 times a week     Comment: socially    Drug use: No    Sexual activity: Yes     Partners: Female   Lifestyle    Physical activity     Days per week: None     Minutes per session: None    Stress: None   Relationships    Social connections     Talks on phone: None     Gets together: None     Attends Jew service: None Active member of club or organization: None     Attends meetings of clubs or organizations: None     Relationship status: None    Intimate partner violence     Fear of current or ex partner: None     Emotionally abused: None     Physically abused: None     Forced sexual activity: None   Other Topics Concern    None   Social History Narrative    Son lives in [place]-9year old--fighting for visitation rights     Family History   Problem Relation Age of Onset    Arthritis Father     Cancer Father     Diabetes Father        Review of Systems   Constitutional: Negative for diaphoresis and unexpected weight change. HENT: Negative for trouble swallowing and voice change. Respiratory: Negative for shortness of breath and wheezing. Cardiovascular: Negative for chest pain and palpitations. Neurological: Negative for dizziness and light-headedness. OBJECTIVE:  Pulse Readings from Last 4 Encounters:   08/13/20 96   12/02/19 104   05/10/19 82   05/01/19 92     Wt Readings from Last 4 Encounters:   08/13/20 219 lb (99.3 kg)   12/02/19 212 lb (96.2 kg)   05/10/19 237 lb (107.5 kg)   05/01/19 218 lb 9.6 oz (99.2 kg)     BP Readings from Last 4 Encounters:   08/13/20 136/86   12/02/19 136/76   05/10/19 129/73   05/01/19 (!) 132/90     Physical Exam  Vitals signs and nursing note reviewed. HENT:      Mouth/Throat:      Comments: Numerous dental caries at the lower jaw. Significant gingivitis. Also have  Early  dental abscess  Eyes:      Conjunctiva/sclera: Conjunctivae normal.   Cardiovascular:      Rate and Rhythm: Normal rate and regular rhythm. Pulmonary:      Effort: Pulmonary effort is normal.      Breath sounds: Normal breath sounds.          CBC:   Lab Results   Component Value Date    WBC 5.7 12/02/2019    HGB 12.8 12/02/2019    HCT 38.6 12/02/2019     12/02/2019     CMP:  Lab Results   Component Value Date     12/02/2019    K 4.0 12/02/2019    CL 99 12/02/2019    CO2 21 12/02/2019 ANIONGAP 22 12/02/2019    GLUCOSE 110 12/02/2019    BUN 15 12/02/2019    CREATININE 1.0 12/02/2019    GFRAA >60 12/02/2019    CALCIUM 9.6 12/02/2019    PROT 7.1 12/02/2019    LABALBU 4.6 12/02/2019    AGRATIO 1.8 12/02/2019    BILITOT <0.2 12/02/2019    ALKPHOS 50 12/02/2019    ALT 22 12/02/2019    AST 29 12/02/2019    GLOB 2.5 12/02/2019     HBA1C:   Lab Results   Component Value Date    LABA1C 7.3 12/02/2019    .8 12/02/2019     LIPID:  Lab Results   Component Value Date    CHOL 200 12/02/2019    TRIG 619 12/02/2019    HDL 83 12/02/2019    LDLCALC see below 12/02/2019    LABVLDL see below 12/02/2019     TSH:   Lab Results   Component Value Date    TSHREFLEX 2.44 12/02/2019         ASSESSMENT/PLAN:    Diallo Ramirez was seen today for oral swelling and diabetes. Diagnoses and all orders for this visit:    Gingivitis  -     amoxicillin (AMOXIL) 500 MG capsule; Take 1 capsule by mouth 3 times daily for 10 days    Dental abscess  -     amoxicillin (AMOXIL) 500 MG capsule; Take 1 capsule by mouth 3 times daily for 10 days  Advised him to make appointment with dentist.  Type 2 diabetes mellitus with diabetic polyneuropathy, with long-term current use of insulin ([facility] [address]    Diabetes not well controlled. Not taking several medicine. He is advised to make follow-up appointment for his underlying chronic problems in the next 4 weeks. No orders of the defined types were placed in this encounter.     Current Outpatient Medications   Medication Sig Dispense Refill    amoxicillin (AMOXIL) 500 MG capsule Take 1 capsule by mouth 3 times daily for 10 days 30 capsule 0    LEVEMIR FLEXTOUCH 100 UNIT/ML injection pen INJECT 20 UNITS INTO THE SKIN NIGHTLY (Patient taking differently: Inject 30 Units into the skin nightly ) 10 mL 0    folic acid (FOLVITE) 1 MG tablet Take 1 tablet by mouth daily 30 tablet 5    omeprazole (PRILOSEC) 40 MG delayed release capsule Take 1 capsule by mouth every morning (before breakfast) 90 capsule 1    NIFEdipine (PROCARDIA XL) 60 MG extended release tablet Take 1 tablet by mouth daily 90 tablet 1    canagliflozin (INVOKANA) 300 MG TABS tablet Take 1 tablet by mouth every morning before breakfast. 90 tablet 1    lisinopril (PRINIVIL;ZESTRIL) 40 MG tablet Take 1 tablet by mouth daily 90 tablet 1    metFORMIN (GLUCOPHAGE) 1000 MG tablet Take 1 tablet by mouth 2 times daily (with meals) (Patient taking differently: Take 1,000 mg by mouth daily (with breakfast) ) 180 tablet 1    Cholecalciferol (VITAMIN D3) 1.25 MG (09248 UT) CAPS 1 tab weekly x 6 months 4 capsule 5    simvastatin (ZOCOR) 20 MG tablet Take 1 tablet by mouth nightly 90 tablet 1    sildenafil (REVATIO) 20 MG tablet Take 20 mg by mouth daily      Cyanocobalamin 1000 MCG SUBL Place 1,000 mcg under the tongue daily 30 tablet 0    BD PEN NEEDLE CAMDEN U/F 32G X 4 MM MISC Inject 1 each into the skin daily 90 each 1    Syringe/Needle, Disp, (SYRINGE 3CC/25GX5/8\") 25G X 5/8\" 3 ML MISC B12 injection weekly x 4 weeks , then monthly 4 each 5     No current facility-administered medications for this visit. Return in about 4 weeks (around 9/10/2020), or for chronic problems. An After Visit Summary was printed and given to the patient. Documentation was done using voice recognition dragon software. Every effort was made to ensure accuracy; however, inadvertent  Unintentional computerized transcription errors may be present.

## 2020-08-14 NOTE — TELEPHONE ENCOUNTER
Pt called requesting Vit B be in a pill or liquid form. (States he cannot do needles)    [facility].

## 2020-09-10 ENCOUNTER — OFFICE VISIT (OUTPATIENT)
Dept: INTERNAL MEDICINE CLINIC | Age: 60
End: 2020-09-10
Payer: COMMERCIAL

## 2020-09-10 VITALS
SYSTOLIC BLOOD PRESSURE: 136 MMHG | DIASTOLIC BLOOD PRESSURE: 80 MMHG | HEART RATE: 102 BPM | WEIGHT: 217 LBS | BODY MASS INDEX: 29.43 KG/M2 | TEMPERATURE: 98.5 F

## 2020-09-10 PROCEDURE — 3046F HEMOGLOBIN A1C LEVEL >9.0%: CPT | Performed by: INTERNAL MEDICINE

## 2020-09-10 PROCEDURE — 3017F COLORECTAL CA SCREEN DOC REV: CPT | Performed by: INTERNAL MEDICINE

## 2020-09-10 PROCEDURE — G8417 CALC BMI ABV UP PARAM F/U: HCPCS | Performed by: INTERNAL MEDICINE

## 2020-09-10 PROCEDURE — G8427 DOCREV CUR MEDS BY ELIG CLIN: HCPCS | Performed by: INTERNAL MEDICINE

## 2020-09-10 PROCEDURE — 1036F TOBACCO NON-USER: CPT | Performed by: INTERNAL MEDICINE

## 2020-09-10 PROCEDURE — 99214 OFFICE O/P EST MOD 30 MIN: CPT | Performed by: INTERNAL MEDICINE

## 2020-09-10 PROCEDURE — 2022F DILAT RTA XM EVC RTNOPTHY: CPT | Performed by: INTERNAL MEDICINE

## 2020-09-10 RX ORDER — LANOLIN ALCOHOL/MO/W.PET/CERES
1000 CREAM (GRAM) TOPICAL DAILY
COMMUNITY
End: 2021-01-22 | Stop reason: SDUPTHER

## 2020-09-10 ASSESSMENT — ENCOUNTER SYMPTOMS
SHORTNESS OF BREATH: 0
WHEEZING: 0
VOMITING: 0
NAUSEA: 0
TROUBLE SWALLOWING: 0
ABDOMINAL PAIN: 0
VOICE CHANGE: 0

## 2020-09-10 NOTE — PROGRESS NOTES
Pam Bird  1960  male  64 y.o. SUBJECTIVE:       Chief Complaint   Patient presents with    1 Month Follow-Up    Other     asking for script for fenofibrate       HPI:  Diabetes:    Pam Bird returns for follow up of his diabetes. Taking medications compliantly without noted side effects. Denies any significant symptoms of hypoglycemia. Denies polyuria,polydipsia,blurry vision, chest pain, dyspnea or claudication. No foot burning,numbness or pain. Follows diet fairly well. Home blood glucose monitoring in the range of- ? Does not know exact number Last eye exam last week . Patient is declining foot exam  Lab Results   Component Value Date    LABA1C 7.3 12/02/2019     Lab Results   Component Value Date    LDLCALC see below 12/02/2019    CREATININE 1.0 12/02/2019   History of hypertension, coronary artery disease. Patient is advised to have cardiac cath by cardiologist.  He persistently declined for further work-up. Today he denies chest pain, palpitation dizziness. GERD:    Denies abdominal pain, abdominal pain radiating to back, abdominal pain radiating to shoulder, anorexia, hoarseness, melena, odynophagia, regurgitation and vomiting  Takes Prilosec daily. Patient report he had HIV testing and hepatitis C testing in urgent care. He is declining colonoscopy. He is declining shingles vaccines and pneumonia vaccine.         Past Medical History:   Diagnosis Date    Coronary artery disease involving native heart without angina pectoris 4/25/2019    Erectile dysfunction 4/25/2019    Hypertension     Mixed hyperlipidemia 4/25/2019    Neuropathy     Sleep apnea      Past Surgical History:   Procedure Laterality Date    FRACTURE SURGERY       Social History     Socioeconomic History    Marital status: Unknown     Spouse name: Not on file    Number of children: 1    Years of education: Not on file    Highest education level: Not on file   Occupational History    Occupation: [employer] 09/10/20 102   08/13/20 96   12/02/19 104   05/10/19 82     Wt Readings from Last 4 Encounters:   09/10/20 217 lb (98.4 kg)   08/13/20 219 lb (99.3 kg)   12/02/19 212 lb (96.2 kg)   05/10/19 237 lb (107.5 kg)     BP Readings from Last 4 Encounters:   09/10/20 136/80   08/13/20 136/86   12/02/19 136/76   05/10/19 129/73     Physical Exam  Vitals signs and nursing note reviewed. Eyes:      Conjunctiva/sclera: Conjunctivae normal.   Cardiovascular:      Rate and Rhythm: Normal rate and regular rhythm. Pulses: Normal pulses. Heart sounds: Normal heart sounds. Pulmonary:      Effort: Pulmonary effort is normal.      Breath sounds: Normal breath sounds. Abdominal:      General: Bowel sounds are normal.      Palpations: Abdomen is soft. Skin:     Capillary Refill: Capillary refill takes less than 2 seconds. Neurological:      General: No focal deficit present. Mental Status: He is alert and oriented to person, place, and time.    Psychiatric:         Behavior: Behavior normal.         CBC:   Lab Results   Component Value Date    WBC 5.7 12/02/2019    HGB 12.8 12/02/2019    HCT 38.6 12/02/2019     12/02/2019     CMP:  Lab Results   Component Value Date     12/02/2019    K 4.0 12/02/2019    CL 99 12/02/2019    CO2 21 12/02/2019    ANIONGAP 22 12/02/2019    GLUCOSE 110 12/02/2019    BUN 15 12/02/2019    CREATININE 1.0 12/02/2019    GFRAA >60 12/02/2019    CALCIUM 9.6 12/02/2019    PROT 7.1 12/02/2019    LABALBU 4.6 12/02/2019    AGRATIO 1.8 12/02/2019    BILITOT <0.2 12/02/2019    ALKPHOS 50 12/02/2019    ALT 22 12/02/2019    AST 29 12/02/2019    GLOB 2.5 12/02/2019     HBA1C:   Lab Results   Component Value Date    LABA1C 7.3 12/02/2019    .8 12/02/2019     LIPID:  Lab Results   Component Value Date    CHOL 200 12/02/2019    TRIG 619 12/02/2019    HDL 83 12/02/2019    LDLCALC see below 12/02/2019    LABVLDL see below 12/02/2019     TSH:   Lab Results   Component Value Date TSHREFLEX 2.44 12/02/2019         ASSESSMENT/PLAN:  Assessment/Plan:  Aretha Jha was seen today for 1 month follow-up and other. Diagnoses and all orders for this visit:    Type 2 diabetes mellitus with diabetic polyneuropathy, with long-term current use of insulin ([facility] [address]  -     CBC; Future  -     Comprehensive Metabolic Panel; Future  -     TSH with Reflex; Future  -     Lipid Panel; Future  -     Hemoglobin A1C; Future  -     Urinalysis; Future  -     MICROALBUMIN / CREATININE URINE RATIO; Future  May need medication adjustment pending lab work. Essential hypertension and history of coronary artery disease and abnormal stress test.  Patient persistently declining to go for further testing including cardiac cath. -     Urinalysis; Future  Continue current lisinopril, simvastatin, nifedipine  Mixed hyperlipidemia  Pending lab work. We will continue current simvastatin. Screening for malignant neoplasm of prostate  -     Psa screening; Future    Colon cancer screening  -     POCT Fecal Immunochemical Test (FIT); Future    Gastroesophageal reflux disease, esophagitis presence not specified  Continue current Prilosec.   Continue nonpharmacological therapy including diet control, weight loss            Orders Placed This Encounter   Procedures    CBC     Standing Status:   Future     Standing Expiration Date:   9/10/2021    Comprehensive Metabolic Panel     Standing Status:   Future     Standing Expiration Date:   9/10/2021    TSH with Reflex     Standing Status:   Future     Standing Expiration Date:   9/10/2021    Psa screening     Standing Status:   Future     Standing Expiration Date:   9/10/2021    Lipid Panel     Standing Status:   Future     Standing Expiration Date:   9/10/2021     Order Specific Question:   Is Patient Fasting?/# of Hours     Answer:   10    Hemoglobin A1C     Standing Status:   Future     Standing Expiration Date:   9/10/2021    Urinalysis     Standing Status:   Future     Standing 12/10/2020). An After Visit Summary was printed and given to the patient. Documentation was done using voice recognition dragon software. Every effort was made to ensure accuracy; however, inadvertent  Unintentional computerized transcription errors may be present.

## 2020-12-09 ENCOUNTER — OFFICE VISIT (OUTPATIENT)
Dept: INTERNAL MEDICINE CLINIC | Age: 60
End: 2020-12-09
Payer: COMMERCIAL

## 2020-12-09 VITALS
DIASTOLIC BLOOD PRESSURE: 70 MMHG | BODY MASS INDEX: 29.66 KG/M2 | WEIGHT: 219 LBS | HEIGHT: 72 IN | HEART RATE: 120 BPM | SYSTOLIC BLOOD PRESSURE: 112 MMHG | TEMPERATURE: 96.8 F

## 2020-12-09 DIAGNOSIS — E11.42 TYPE 2 DIABETES MELLITUS WITH DIABETIC POLYNEUROPATHY, WITH LONG-TERM CURRENT USE OF INSULIN (HCC): ICD-10-CM

## 2020-12-09 DIAGNOSIS — I10 ESSENTIAL HYPERTENSION: ICD-10-CM

## 2020-12-09 DIAGNOSIS — Z79.4 TYPE 2 DIABETES MELLITUS WITH DIABETIC POLYNEUROPATHY, WITH LONG-TERM CURRENT USE OF INSULIN (HCC): ICD-10-CM

## 2020-12-09 DIAGNOSIS — E78.2 MIXED HYPERLIPIDEMIA: ICD-10-CM

## 2020-12-09 DIAGNOSIS — E53.8 B12 DEFICIENCY: ICD-10-CM

## 2020-12-09 PROCEDURE — G8427 DOCREV CUR MEDS BY ELIG CLIN: HCPCS | Performed by: INTERNAL MEDICINE

## 2020-12-09 PROCEDURE — 1036F TOBACCO NON-USER: CPT | Performed by: INTERNAL MEDICINE

## 2020-12-09 PROCEDURE — 3017F COLORECTAL CA SCREEN DOC REV: CPT | Performed by: INTERNAL MEDICINE

## 2020-12-09 PROCEDURE — 2022F DILAT RTA XM EVC RTNOPTHY: CPT | Performed by: INTERNAL MEDICINE

## 2020-12-09 PROCEDURE — 3046F HEMOGLOBIN A1C LEVEL >9.0%: CPT | Performed by: INTERNAL MEDICINE

## 2020-12-09 PROCEDURE — 99214 OFFICE O/P EST MOD 30 MIN: CPT | Performed by: INTERNAL MEDICINE

## 2020-12-09 PROCEDURE — G8417 CALC BMI ABV UP PARAM F/U: HCPCS | Performed by: INTERNAL MEDICINE

## 2020-12-09 PROCEDURE — G8484 FLU IMMUNIZE NO ADMIN: HCPCS | Performed by: INTERNAL MEDICINE

## 2020-12-09 RX ORDER — FENOFIBRATE 145 MG/1
145 TABLET, COATED ORAL DAILY
Qty: 30 TABLET | Refills: 3 | Status: SHIPPED | OUTPATIENT
Start: 2020-12-09 | End: 2021-04-19

## 2020-12-09 RX ORDER — GABAPENTIN 300 MG/1
300 CAPSULE ORAL NIGHTLY
Qty: 90 CAPSULE | Refills: 1 | Status: SHIPPED | OUTPATIENT
Start: 2020-12-09 | End: 2021-03-10

## 2020-12-09 RX ORDER — DULAGLUTIDE 0.75 MG/.5ML
0.75 INJECTION, SOLUTION SUBCUTANEOUS WEEKLY
Qty: 4 PEN | Refills: 2 | Status: SHIPPED | OUTPATIENT
Start: 2020-12-09 | End: 2021-01-22 | Stop reason: HOSPADM

## 2020-12-09 RX ORDER — ASPIRIN 81 MG/1
81 TABLET ORAL DAILY
Qty: 90 TABLET | Refills: 3 | Status: SHIPPED | OUTPATIENT
Start: 2020-12-09

## 2020-12-09 ASSESSMENT — ENCOUNTER SYMPTOMS
VOICE CHANGE: 0
WHEEZING: 0
TROUBLE SWALLOWING: 0
NAUSEA: 0
SHORTNESS OF BREATH: 0
VOMITING: 0
ABDOMINAL PAIN: 0

## 2020-12-09 ASSESSMENT — PATIENT HEALTH QUESTIONNAIRE - PHQ9
2. FEELING DOWN, DEPRESSED OR HOPELESS: 0
SUM OF ALL RESPONSES TO PHQ QUESTIONS 1-9: 0
SUM OF ALL RESPONSES TO PHQ9 QUESTIONS 1 & 2: 0
1. LITTLE INTEREST OR PLEASURE IN DOING THINGS: 0
SUM OF ALL RESPONSES TO PHQ QUESTIONS 1-9: 0
SUM OF ALL RESPONSES TO PHQ QUESTIONS 1-9: 0

## 2020-12-09 NOTE — PROGRESS NOTES
Matthew Logan  1960  male  64 y.o. SUBJECTIVE:       Chief Complaint   Patient presents with    3 Month Follow-Up     find fenofibrate--off for months, denies wanting flu vaccine    Diabetes     questions on meds       HPI:  Follow-up visit for chronic problem. Patient reports his blood sugar going up to 200, he had to increase his insulin to 40 units/day. He is also gaining more weight. He is requesting Trulicity or alternate  He never had a history of pancreatitis. Cannot recall any family history of thyroid or parathyroid cancer. He is now having increasing diabetic neuropathy symptoms. He used to take gabapentin 900 mg per night. History of B12 deficiency he is no longer taking the supplement. History of dyslipidemia with elevated triglyceride. He used to take TriCor in the past.  Continue to take simvastatin. Patient denies chest pain palpitation dizziness. History of coronary artery disease.       Past Medical History:   Diagnosis Date    Coronary artery disease involving native heart without angina pectoris 2019    Erectile dysfunction 2019    Hypertension     Mixed hyperlipidemia 2019    Neuropathy     Sleep apnea      Past Surgical History:   Procedure Laterality Date    FRACTURE SURGERY       Social History     Socioeconomic History    Marital status: Unknown     Spouse name: None    Number of children: 1    Years of education: None    Highest education level: None   Occupational History    Occupation: [employer]   Social Needs    Financial resource strain: None    Food insecurity     Worry: None     Inability: None    Transportation needs     Medical: None     Non-medical: None   Tobacco Use    Smoking status: Former Smoker     Packs/day: 1.00     Years: 34.00     Pack years: 34.00     Types: Cigarettes     Start date: 1978     Last attempt to quit: 1/3/2012     Years since quittin.9    Smokeless tobacco: Never Used   Substance and Sexual Activity    Alcohol use: Yes     Frequency: 2-3 times a week     Comment: socially    Drug use: No    Sexual activity: Yes     Partners: Female   Lifestyle    Physical activity     Days per week: None     Minutes per session: None    Stress: None   Relationships    Social connections     Talks on phone: None     Gets together: None     Attends Caodaism service: None     Active member of club or organization: None     Attends meetings of clubs or organizations: None     Relationship status: None    Intimate partner violence     Fear of current or ex partner: None     Emotionally abused: None     Physically abused: None     Forced sexual activity: None   Other Topics Concern    None   Social History Narrative    Son lives in [place]-9year old--fighting for visitation rights     Family History   Problem Relation Age of Onset    Arthritis Father     Cancer Father     Diabetes Father        Review of Systems   Constitutional: Negative for diaphoresis and unexpected weight change. HENT: Negative for trouble swallowing and voice change. Respiratory: Negative for shortness of breath and wheezing. Cardiovascular: Negative for chest pain and palpitations. Gastrointestinal: Negative for abdominal pain, nausea and vomiting. Endocrine: Negative for polyphagia and polyuria. Neurological: Negative for dizziness and light-headedness. OBJECTIVE:  Pulse Readings from Last 4 Encounters:   12/09/20 120   09/10/20 102   08/13/20 96   12/02/19 104     Wt Readings from Last 4 Encounters:   12/09/20 219 lb (99.3 kg)   09/10/20 217 lb (98.4 kg)   08/13/20 219 lb (99.3 kg)   12/02/19 212 lb (96.2 kg)     BP Readings from Last 4 Encounters:   12/09/20 112/70   09/10/20 136/80   08/13/20 136/86   12/02/19 136/76     Physical Exam  Constitutional:       Appearance: Normal appearance. Eyes:      Conjunctiva/sclera: Conjunctivae normal.   Cardiovascular:      Rate and Rhythm: Normal rate and regular rhythm. tablet 0    folic acid (FOLVITE) 1 MG tablet Take 1 tablet by mouth daily 30 tablet 5    omeprazole (PRILOSEC) 40 MG delayed release capsule Take 1 capsule by mouth every morning (before breakfast) 90 capsule 1    NIFEdipine (PROCARDIA XL) 60 MG extended release tablet Take 1 tablet by mouth daily 90 tablet 1    canagliflozin (INVOKANA) 300 MG TABS tablet Take 1 tablet by mouth every morning before breakfast. 90 tablet 1    lisinopril (PRINIVIL;ZESTRIL) 40 MG tablet Take 1 tablet by mouth daily 90 tablet 1    metFORMIN (GLUCOPHAGE) 1000 MG tablet Take 1 tablet by mouth 2 times daily (with meals) (Patient taking differently: Take 1,000 mg by mouth daily (with breakfast) ) 180 tablet 1    BD PEN NEEDLE CAMDEN U/F 32G X 4 MM MISC Inject 1 each into the skin daily 90 each 1    Cholecalciferol (VITAMIN D3) 1.25 MG (03070 UT) CAPS 1 tab weekly x 6 months 4 capsule 5    Syringe/Needle, Disp, (SYRINGE 3CC/25GX5/8\") 25G X 5/8\" 3 ML MISC B12 injection weekly x 4 weeks , then monthly 4 each 5    simvastatin (ZOCOR) 20 MG tablet Take 1 tablet by mouth nightly 90 tablet 1    sildenafil (REVATIO) 20 MG tablet Take 20 mg by mouth daily I am not prescribing      No current facility-administered medications for this visit. Return in about 3 months (around 3/9/2021). An After Visit Summary was printed and given to the patient. Documentation was done using voice recognition dragon software. Every effort was made to ensure accuracy; however, inadvertent  Unintentional computerized transcription errors may be present.

## 2020-12-10 LAB
ALBUMIN SERPL-MCNC: 4.5 G/DL (ref 3.4–5)
ALP BLD-CCNC: 92 U/L (ref 40–129)
ALT SERPL-CCNC: 16 U/L (ref 10–40)
ANION GAP SERPL CALCULATED.3IONS-SCNC: 18 MMOL/L (ref 3–16)
AST SERPL-CCNC: <5 U/L (ref 15–37)
BILIRUB SERPL-MCNC: 0.3 MG/DL (ref 0–1)
BILIRUBIN DIRECT: <0.2 MG/DL (ref 0–0.3)
BILIRUBIN, INDIRECT: ABNORMAL MG/DL (ref 0–1)
BUN BLDV-MCNC: 17 MG/DL (ref 7–20)
CALCIUM SERPL-MCNC: 10.2 MG/DL (ref 8.3–10.6)
CHLORIDE BLD-SCNC: 96 MMOL/L (ref 99–110)
CO2: 22 MMOL/L (ref 21–32)
CREAT SERPL-MCNC: 1.2 MG/DL (ref 0.9–1.3)
ESTIMATED AVERAGE GLUCOSE: 280.5 MG/DL
FOLATE: >20 NG/ML (ref 4.78–24.2)
GFR AFRICAN AMERICAN: >60
GFR NON-AFRICAN AMERICAN: >60
GLUCOSE BLD-MCNC: 232 MG/DL (ref 70–99)
HBA1C MFR BLD: 11.4 %
POTASSIUM SERPL-SCNC: 4.1 MMOL/L (ref 3.5–5.1)
SODIUM BLD-SCNC: 136 MMOL/L (ref 136–145)
TOTAL PROTEIN: 7.4 G/DL (ref 6.4–8.2)
VITAMIN B-12: 287 PG/ML (ref 211–911)

## 2021-01-08 DIAGNOSIS — E11.42 TYPE 2 DIABETES MELLITUS WITH DIABETIC POLYNEUROPATHY, WITH LONG-TERM CURRENT USE OF INSULIN (HCC): ICD-10-CM

## 2021-01-08 DIAGNOSIS — Z79.4 TYPE 2 DIABETES MELLITUS WITH DIABETIC POLYNEUROPATHY, WITH LONG-TERM CURRENT USE OF INSULIN (HCC): ICD-10-CM

## 2021-01-08 DIAGNOSIS — I10 ESSENTIAL HYPERTENSION: ICD-10-CM

## 2021-01-08 RX ORDER — CANAGLIFLOZIN 300 MG/1
TABLET, FILM COATED ORAL
Qty: 30 TABLET | Refills: 1 | Status: SHIPPED | OUTPATIENT
Start: 2021-01-08 | End: 2021-03-10

## 2021-01-08 RX ORDER — OMEPRAZOLE 40 MG/1
CAPSULE, DELAYED RELEASE ORAL
Qty: 30 CAPSULE | Refills: 1 | Status: SHIPPED | OUTPATIENT
Start: 2021-01-08 | End: 2021-03-10

## 2021-01-08 RX ORDER — NIFEDIPINE 60 MG/1
TABLET, EXTENDED RELEASE ORAL
Qty: 30 TABLET | Refills: 1 | Status: SHIPPED | OUTPATIENT
Start: 2021-01-08 | End: 2021-03-10

## 2021-01-08 RX ORDER — ERGOCALCIFEROL 1.25 MG/1
CAPSULE ORAL
Qty: 4 CAPSULE | Refills: 5 | Status: SHIPPED | OUTPATIENT
Start: 2021-01-08 | End: 2021-07-15

## 2021-01-08 RX ORDER — LISINOPRIL 40 MG/1
TABLET ORAL
Qty: 30 TABLET | Refills: 1 | Status: SHIPPED | OUTPATIENT
Start: 2021-01-08 | End: 2021-03-10

## 2021-01-22 ENCOUNTER — OFFICE VISIT (OUTPATIENT)
Dept: INTERNAL MEDICINE CLINIC | Age: 61
End: 2021-01-22
Payer: COMMERCIAL

## 2021-01-22 VITALS
HEART RATE: 108 BPM | BODY MASS INDEX: 30.75 KG/M2 | DIASTOLIC BLOOD PRESSURE: 78 MMHG | SYSTOLIC BLOOD PRESSURE: 122 MMHG | HEIGHT: 72 IN | TEMPERATURE: 97.3 F | WEIGHT: 227 LBS

## 2021-01-22 DIAGNOSIS — Z79.4 TYPE 2 DIABETES MELLITUS WITH DIABETIC POLYNEUROPATHY, WITH LONG-TERM CURRENT USE OF INSULIN (HCC): Primary | ICD-10-CM

## 2021-01-22 DIAGNOSIS — E11.42 TYPE 2 DIABETES MELLITUS WITH DIABETIC POLYNEUROPATHY, WITH LONG-TERM CURRENT USE OF INSULIN (HCC): Primary | ICD-10-CM

## 2021-01-22 DIAGNOSIS — M25.512 CHRONIC LEFT SHOULDER PAIN: ICD-10-CM

## 2021-01-22 DIAGNOSIS — I10 ESSENTIAL HYPERTENSION: ICD-10-CM

## 2021-01-22 DIAGNOSIS — E11.65 UNCONTROLLED TYPE 2 DIABETES MELLITUS WITH HYPERGLYCEMIA (HCC): ICD-10-CM

## 2021-01-22 DIAGNOSIS — Z80.0 FAMILY HISTORY OF PANCREATIC CANCER: ICD-10-CM

## 2021-01-22 DIAGNOSIS — R10.9 ABDOMINAL DISCOMFORT: ICD-10-CM

## 2021-01-22 DIAGNOSIS — G89.29 CHRONIC LEFT SHOULDER PAIN: ICD-10-CM

## 2021-01-22 PROCEDURE — 1036F TOBACCO NON-USER: CPT | Performed by: INTERNAL MEDICINE

## 2021-01-22 PROCEDURE — G8417 CALC BMI ABV UP PARAM F/U: HCPCS | Performed by: INTERNAL MEDICINE

## 2021-01-22 PROCEDURE — 99214 OFFICE O/P EST MOD 30 MIN: CPT | Performed by: INTERNAL MEDICINE

## 2021-01-22 PROCEDURE — 3017F COLORECTAL CA SCREEN DOC REV: CPT | Performed by: INTERNAL MEDICINE

## 2021-01-22 PROCEDURE — 2022F DILAT RTA XM EVC RTNOPTHY: CPT | Performed by: INTERNAL MEDICINE

## 2021-01-22 PROCEDURE — G8484 FLU IMMUNIZE NO ADMIN: HCPCS | Performed by: INTERNAL MEDICINE

## 2021-01-22 PROCEDURE — 3046F HEMOGLOBIN A1C LEVEL >9.0%: CPT | Performed by: INTERNAL MEDICINE

## 2021-01-22 PROCEDURE — G8428 CUR MEDS NOT DOCUMENT: HCPCS | Performed by: INTERNAL MEDICINE

## 2021-01-22 RX ORDER — BLOOD PRESSURE TEST KIT
KIT MISCELLANEOUS
Qty: 1 KIT | Refills: 0 | Status: SHIPPED | OUTPATIENT
Start: 2021-01-22

## 2021-01-22 RX ORDER — MELOXICAM 7.5 MG/1
7.5 TABLET ORAL DAILY
Qty: 30 TABLET | Refills: 3 | Status: SHIPPED | OUTPATIENT
Start: 2021-01-22

## 2021-01-22 RX ORDER — INSULIN DETEMIR 100 [IU]/ML
30 INJECTION, SOLUTION SUBCUTANEOUS 2 TIMES DAILY
Qty: 15 ML | Refills: 5 | Status: SHIPPED | OUTPATIENT
Start: 2021-01-22 | End: 2021-02-15 | Stop reason: SDUPTHER

## 2021-01-22 ASSESSMENT — ENCOUNTER SYMPTOMS
TROUBLE SWALLOWING: 0
WHEEZING: 0
SHORTNESS OF BREATH: 0
VOMITING: 0
ABDOMINAL PAIN: 0
NAUSEA: 0
VOICE CHANGE: 0

## 2021-01-22 NOTE — PROGRESS NOTES
Kanu Neither  1960  male  64 y.o. SUBJECTIVE:       Chief Complaint   Patient presents with    Diabetes     sugars around 300 , refusing foot exam.      Other     refusing vaccinations. HPI:  Follow-up visit. He still get as high as 300 blood sugar. Most of the time blood sugar between 100-270  He does not want to take Trulicity anymore. He described his more painful than taking insulin  His current job more sedentary. He was  before changing to current job. He is currently taking 50 units of Levemir. He is taking all other listed antidiabetic medicine. He gets periodic discomfort in the epigastric area. He reports his father as well as mother both  of pancreatic cancer. He is worried about having pancreatic cancer and his diabetes getting out of control. He is declining preventive health. Currently he is taking gabapentin 300 mg 3 times daily with has been helping most of the time for neuropathy pain. He is considering we may have to increase the dose of gabapentin. Patient r is requesting a blood pressure. History of chronic left shoulder pain. He completed physical therapy days of 16. He feels his left shoulder pain is coming back.       Past Medical History:   Diagnosis Date    Coronary artery disease involving native heart without angina pectoris 2019    Erectile dysfunction 2019    Hypertension     Mixed hyperlipidemia 2019    Neuropathy     Sleep apnea      Past Surgical History:   Procedure Laterality Date    FRACTURE SURGERY       Social History     Socioeconomic History    Marital status: Unknown     Spouse name: None    Number of children: 1    Years of education: None    Highest education level: None   Occupational History    Occupation: [employer]   Social Needs    Financial resource strain: None    Food insecurity     Worry: None     Inability: None    Transportation needs     Medical: None     Non-medical: None Tobacco Use    Smoking status: Former Smoker     Packs/day: 1.00     Years: 34.00     Pack years: 34.00     Types: Cigarettes     Start date: 1978     Quit date: 1/3/2012     Years since quittin.0    Smokeless tobacco: Never Used   Substance and Sexual Activity    Alcohol use: Yes     Frequency: 2-3 times a week     Comment: socially    Drug use: No    Sexual activity: Yes     Partners: Female   Lifestyle    Physical activity     Days per week: None     Minutes per session: None    Stress: None   Relationships    Social connections     Talks on phone: None     Gets together: None     Attends Amish service: None     Active member of club or organization: None     Attends meetings of clubs or organizations: None     Relationship status: None    Intimate partner violence     Fear of current or ex partner: None     Emotionally abused: None     Physically abused: None     Forced sexual activity: None   Other Topics Concern    None   Social History Narrative    Son lives in [place]-9year old--fighting for visitation rights     Family History   Problem Relation Age of Onset    Arthritis Father     Cancer Father         pancrea cancer    Diabetes Father     Pancreatic Cancer Mother        Review of Systems   Constitutional: Negative for diaphoresis and unexpected weight change. HENT: Negative for trouble swallowing and voice change. Respiratory: Negative for shortness of breath and wheezing. Cardiovascular: Negative for chest pain and palpitations. Gastrointestinal: Negative for abdominal pain, nausea and vomiting. Neurological: Negative for dizziness and light-headedness.        OBJECTIVE:  Pulse Readings from Last 4 Encounters:   21 108   20 120   09/10/20 102   20 96     Wt Readings from Last 4 Encounters:   21 227 lb (103 kg)   20 219 lb (99.3 kg)   09/10/20 217 lb (98.4 kg)   20 219 lb (99.3 kg)     BP Readings from Last 4 Encounters: 01/22/21 122/78   12/09/20 112/70   09/10/20 136/80   08/13/20 136/86     Physical Exam  Vitals signs and nursing note reviewed. Constitutional:       Appearance: Normal appearance. Eyes:      Conjunctiva/sclera: Conjunctivae normal.   Cardiovascular:      Rate and Rhythm: Normal rate and regular rhythm. Pulses: Normal pulses. Heart sounds: Normal heart sounds. Pulmonary:      Effort: Pulmonary effort is normal.      Breath sounds: Normal breath sounds. Abdominal:      General: Bowel sounds are normal.      Tenderness: There is no guarding or rebound. Musculoskeletal:      Comments: Decrease range of motion of the left shoulder. .   Neurological:      Mental Status: He is alert and oriented to person, place, and time. CBC:   Lab Results   Component Value Date    WBC 5.7 12/02/2019    HGB 12.8 12/02/2019    HCT 38.6 12/02/2019     12/02/2019     CMP:  Lab Results   Component Value Date     12/09/2020    K 4.1 12/09/2020    CL 96 12/09/2020    CO2 22 12/09/2020    ANIONGAP 18 12/09/2020    GLUCOSE 232 12/09/2020    BUN 17 12/09/2020    CREATININE 1.2 12/09/2020    GFRAA >60 12/09/2020    CALCIUM 10.2 12/09/2020    PROT 7.4 12/09/2020    LABALBU 4.5 12/09/2020    AGRATIO 1.8 12/02/2019    BILITOT 0.3 12/09/2020    ALKPHOS 92 12/09/2020    ALT 16 12/09/2020    AST <5 12/09/2020    GLOB 2.5 12/02/2019     URINALYSIS:No results found for: Radha Raja, SPECGRAV, BLOODU, PHUR, PROTEINU, NITRU, LEUKOCYTESUR, LABMICR, URINETYPE  HBA1C:   Lab Results   Component Value Date    LABA1C 11.4 12/09/2020    .5 12/09/2020     LIPID:  Lab Results   Component Value Date    CHOL 200 12/02/2019    TRIG 619 12/02/2019    HDL 83 12/02/2019    LDLCALC see below 12/02/2019    LABVLDL see below 12/02/2019     TSH:   Lab Results   Component Value Date    TSHREFLEX 2.44 12/02/2019         ASSESSMENT/PLAN:  :  Meryle Older was seen today for diabetes and other. Diagnoses and all orders for this visit:    Type 2 diabetes mellitus with diabetic polyneuropathy, with long-term current use of insulin ([address]  Uncontrolled diabetes. Advised with dietary compliance. Aldair Parkinson MD, Endocrinology, [facility]    Family history of pancreatic cancer  Patient report history of pancreatic cancer to both parents  -     [facility] Genetic Risk Evaluation & Testing Program  -     CT ABDOMEN PELVIS WO CONTRAST Additional Contrast? Radiologist Recommendation; Future    Chronic left shoulder pain  Most likely associated tendinopathy/ frozen shoulder  May need physical therapy or considering referral to orthopedic    Uncontrolled type 2 diabetes mellitus with hyperglycemia ([address]  -     CT ABDOMEN PELVIS WO CONTRAST Additional Contrast? Radiologist Recommendation; Future    Abdominal discomfort  -     CT ABDOMEN PELVIS WO CONTRAST Additional Contrast? Radiologist Recommendation; Future    Essential hypertension  -     Blood Pressure KIT; Use as directed    Other orders  -     insulin detemir (LEVEMIR FLEXTOUCH) 100 UNIT/ML injection pen; Inject 30 Units into the skin 2 times daily  -     meloxicam (MOBIC) 7.5 MG tablet; Take 1 tablet by mouth daily        An After Visit Summary was printed and given to the patient.         Orders Placed This Encounter   Procedures    CT ABDOMEN PELVIS WO CONTRAST Additional Contrast? Radiologist Recommendation     Standing Status:   Future     Standing Expiration Date:   1/22/2022     Order Specific Question:   Additional Contrast?     Answer:   Radiologist Recommendation     Order Specific Question:   Reason for exam:     Answer:   r/ pancreatic cancer    [facility] Genetic Risk Evaluation & Testing Program     Referral Priority:   Routine     Referral Type:   Eval and Treat     Referral Reason:   Specialty Services Required     Referral Location:   [facility]) Requested Specialty:   Genetics     Number of Visits Requested:   Yung Dunham MD, Endocrinology, [facility]     Referral Priority:   Routine     Referral Type:   Eval and Treat     Referral Reason:   Specialty Services Required     Referred to Provider:   Teri Stein MD     Requested Specialty:   Endocrinology, Diabetes, & Metabolism     Number of Visits Requested:   1     Current Outpatient Medications   Medication Sig Dispense Refill    insulin detemir (LEVEMIR FLEXTOUCH) 100 UNIT/ML injection pen Inject 30 Units into the skin 2 times daily 15 mL 5    meloxicam (MOBIC) 7.5 MG tablet Take 1 tablet by mouth daily 30 tablet 3    Blood Pressure KIT Use as directed 1 kit 0    vitamin D (ERGOCALCIFEROL) 1.25 MG (31017 UT) CAPS capsule 1 CAPSULE BY MOUTH ONCE WEEKLY FOR 6 MONTHS (PRESCRIBER OK IS NEEDED FOR NEXT FILL) 4 capsule 5    omeprazole (PRILOSEC) 40 MG delayed release capsule 1 CAPSULE BY MOUTH EVERY MORNING BEFORE BREAKFAST (PRESCRIBER OK IS NEEDED FOR NEXT FILL) 30 capsule 1    NIFEdipine (PROCARDIA XL) 60 MG extended release tablet 1 TABLET BY MOUTH ONCE DAILY (PRESCRIBER OK IS NEEDED FOR NEXT FILL) 30 tablet 1    lisinopril (PRINIVIL;ZESTRIL) 40 MG tablet 1 TABLET BY MOUTH ONCE DAILY (PRESCRIBER OK IS NEEDED FOR NEXT FILL) 30 tablet 1    canagliflozin (INVOKANA) 300 MG TABS tablet 1 TABLET BY MOUTH EVERY MORNING BEFORE BREAKFAST (PRESCRIBER OK IS NEEDED FOR NEXT FILL) 30 tablet 1    Cyanocobalamin 1000 MCG SUBL Place 1,000 mcg under the tongue daily 30 tablet 5    fenofibrate (TRICOR) 145 MG tablet Take 1 tablet by mouth daily 30 tablet 3    gabapentin (NEURONTIN) 300 MG capsule Take 1 capsule by mouth nightly for 180 days. May increase to 600 mg after 1 week if needed. (Patient taking differently: Take 300 mg by mouth nightly.  Still on 300 mg nightly------May increase to 600 mg) 90 capsule 1    aspirin EC 81 MG EC tablet Take 1 tablet by mouth daily 90 tablet 3  folic acid (FOLVITE) 1 MG tablet Take 1 tablet by mouth daily 30 tablet 5    metFORMIN (GLUCOPHAGE) 1000 MG tablet Take 1 tablet by mouth 2 times daily (with meals) (Patient taking differently: Take 1,000 mg by mouth daily (with breakfast) ) 180 tablet 1    Cholecalciferol (VITAMIN D3) 1.25 MG (56956 UT) CAPS 1 tab weekly x 6 months 4 capsule 5    sildenafil (REVATIO) 20 MG tablet Take 20 mg by mouth daily as needed       BD PEN NEEDLE CAMDEN U/F 32G X 4 MM MISC Inject 1 each into the skin daily 90 each 1    Syringe/Needle, Disp, (SYRINGE 3CC/25GX5/8\") 25G X 5/8\" 3 ML MISC B12 injection weekly x 4 weeks , then monthly 4 each 5    simvastatin (ZOCOR) 20 MG tablet Take 1 tablet by mouth nightly (Patient not taking: Reported on 1/22/2021) 90 tablet 1     No current facility-administered medications for this visit. Return in about 6 weeks (around 3/5/2021). An After Visit Summary was printed and given to the patient. Documentation was done using voice recognition dragon software. Every effort was made to ensure accuracy; however, inadvertent  Unintentional computerized transcription errors may be present.

## 2021-02-09 DIAGNOSIS — E11.42 TYPE 2 DIABETES MELLITUS WITH DIABETIC POLYNEUROPATHY, WITH LONG-TERM CURRENT USE OF INSULIN (HCC): ICD-10-CM

## 2021-02-09 DIAGNOSIS — Z79.4 TYPE 2 DIABETES MELLITUS WITH DIABETIC POLYNEUROPATHY, WITH LONG-TERM CURRENT USE OF INSULIN (HCC): ICD-10-CM

## 2021-02-09 RX ORDER — BLOOD-GLUCOSE METER
EACH MISCELLANEOUS
Qty: 100 EACH | Refills: 1 | Status: SHIPPED | OUTPATIENT
Start: 2021-02-09

## 2021-02-09 RX ORDER — FOLIC ACID 1 MG/1
TABLET ORAL
Qty: 30 TABLET | Refills: 5 | Status: SHIPPED | OUTPATIENT
Start: 2021-02-09 | End: 2021-08-17

## 2021-02-15 ENCOUNTER — TELEPHONE (OUTPATIENT)
Dept: INTERNAL MEDICINE CLINIC | Age: 61
End: 2021-02-15

## 2021-02-15 RX ORDER — INSULIN DETEMIR 100 [IU]/ML
30 INJECTION, SOLUTION SUBCUTANEOUS 2 TIMES DAILY
Qty: 18 ML | Refills: 5 | Status: SHIPPED | OUTPATIENT
Start: 2021-02-15 | End: 2021-07-13 | Stop reason: SDUPTHER

## 2021-02-15 NOTE — TELEPHONE ENCOUNTER
----- Message from Lane Aguirre sent at 2/13/2021 11:42 AM EST -----  Subject: Refill Request    QUESTIONS  Name of Medication? insulin detemir (LEVEMIR FLEXTOUCH) 100 UNIT/ML   injection pen  Patient-reported dosage and instructions? Twice a day   How many days do you have left? 0  Preferred Pharmacy? [address]  phone number (if available)? [phone]  Additional Information for Provider? Patient is out due to dosage and is   saying he needs a bigger supply of medication. Please contact patient   regards to this matter. ---------------------------------------------------------------------------  --------------  [facility]  What is the best way for the office to contact you? OK to leave message on   voicemail  Preferred Call Back Phone Number?  [phone]

## 2021-03-01 ENCOUNTER — VIRTUAL VISIT (OUTPATIENT)
Dept: ENDOCRINOLOGY | Age: 61
End: 2021-03-01
Payer: COMMERCIAL

## 2021-03-01 DIAGNOSIS — I10 ESSENTIAL HYPERTENSION: ICD-10-CM

## 2021-03-01 DIAGNOSIS — N52.01 ERECTILE DYSFUNCTION DUE TO ARTERIAL INSUFFICIENCY: ICD-10-CM

## 2021-03-01 DIAGNOSIS — E78.2 MIXED HYPERLIPIDEMIA: ICD-10-CM

## 2021-03-01 DIAGNOSIS — I25.10 CORONARY ARTERY DISEASE INVOLVING NATIVE CORONARY ARTERY OF NATIVE HEART WITHOUT ANGINA PECTORIS: ICD-10-CM

## 2021-03-01 PROCEDURE — 2022F DILAT RTA XM EVC RTNOPTHY: CPT | Performed by: INTERNAL MEDICINE

## 2021-03-01 PROCEDURE — 3046F HEMOGLOBIN A1C LEVEL >9.0%: CPT | Performed by: INTERNAL MEDICINE

## 2021-03-01 PROCEDURE — G8427 DOCREV CUR MEDS BY ELIG CLIN: HCPCS | Performed by: INTERNAL MEDICINE

## 2021-03-01 PROCEDURE — 3017F COLORECTAL CA SCREEN DOC REV: CPT | Performed by: INTERNAL MEDICINE

## 2021-03-01 PROCEDURE — 99204 OFFICE O/P NEW MOD 45 MIN: CPT | Performed by: INTERNAL MEDICINE

## 2021-03-01 NOTE — PROGRESS NOTES
heart without angina pectoris 2019    Erectile dysfunction 2019    Hypertension     Mixed hyperlipidemia 2019    Neuropathy     Sleep apnea       Patient Active Problem List   Diagnosis    Abnormal ECG    Abnormal nuclear stress test    Type 2 diabetes mellitus with diabetic polyneuropathy, with long-term current use of insulin (HCC)    Essential hypertension    Coronary artery disease involving native heart without angina pectoris    Erectile dysfunction    Fatigue    Mixed hyperlipidemia    Vitamin B12 deficiency    Folate deficiency    Vitamin D deficiency     Past Surgical History:   Procedure Laterality Date    FRACTURE SURGERY       Social History     Socioeconomic History    Marital status: Single     Spouse name: Not on file    Number of children: 1    Years of education: Not on file    Highest education level: Not on file   Occupational History    Occupation: [employer] [address]   Social Needs    Financial resource strain: Not on file    Food insecurity     Worry: Not on file     Inability: Not on file   [employer] needs     Medical: Not on file     Non-medical: Not on file   Tobacco Use    Smoking status: Former Smoker     Packs/day: 1.00     Years: 34.00     Pack years: 34.00     Types: Cigarettes     Start date: 1978     Quit date: 1/3/2012     Years since quittin.1    Smokeless tobacco: Never Used   Substance and Sexual Activity    Alcohol use: Yes     Frequency: 2-3 times a week     Comment: socially    Drug use: No    Sexual activity: Yes     Partners: Female   Lifestyle    Physical activity     Days per week: Not on file     Minutes per session: Not on file    Stress: Not on file   Relationships    Social connections     Talks on phone: Not on file     Gets together: Not on file     Attends Caodaism service: Not on file     Active member of club or organization: Not on file     Attends meetings of clubs or organizations: Not on file     Relationship status: Not on file    Intimate partner violence     Fear of current or ex partner: Not on file     Emotionally abused: Not on file     Physically abused: Not on file     Forced sexual activity: Not on file   Other Topics Concern    Not on file   Social History Narrative    Son lives in [place]-9year old--fighting for visitation rights     Family History   Problem Relation Age of Onset    Arthritis Father     Cancer Father         pancrea cancer    Diabetes Father     Pancreatic Cancer Mother      Current Outpatient Medications   Medication Sig Dispense Refill    insulin detemir (LEVEMIR FLEXTOUCH) 100 UNIT/ML injection pen Inject 30 Units into the skin 2 times daily 18 mL 5    EASY COMFORT PEN NEEDLES 32G X 4 MM MISC USE AS DIRECTED ONCE DAILY (PRESCRIBER OK NEEDED FOR NEXT REFILL) 681 each 1    folic acid (FOLVITE) 1 MG tablet 1 TABLET BY MOUTH ONCE DAILY **MAY REFILL** 30 tablet 5    meloxicam (MOBIC) 7.5 MG tablet Take 1 tablet by mouth daily 30 tablet 3    Blood Pressure KIT Use as directed 1 kit 0    vitamin D (ERGOCALCIFEROL) 1.25 MG (80621 UT) CAPS capsule 1 CAPSULE BY MOUTH ONCE WEEKLY FOR 6 MONTHS (PRESCRIBER OK IS NEEDED FOR NEXT FILL) 4 capsule 5    Cyanocobalamin 1000 MCG SUBL Place 1,000 mcg under the tongue daily 30 tablet 5    fenofibrate (TRICOR) 145 MG tablet Take 1 tablet by mouth daily 30 tablet 3    aspirin EC 81 MG EC tablet Take 1 tablet by mouth daily 90 tablet 3    Cholecalciferol (VITAMIN D3) 1.25 MG (33983 UT) CAPS 1 tab weekly x 6 months 4 capsule 5    Syringe/Needle, Disp, (SYRINGE 3CC/25GX5/8\") 25G X 5/8\" 3 ML MISC B12 injection weekly x 4 weeks , then monthly 4 each 5    sildenafil (REVATIO) 20 MG tablet Take 20 mg by mouth daily as needed       gabapentin (NEURONTIN) 300 MG capsule Take 1 capsule by mouth nightly for 180 days.  Still on 300 mg nightly------May increase to 600 mg 60 capsule 5    canagliflozin (INVOKANA) 300 MG TABS tablet 1 TABLET BY MOUTH EVERY MORNING BEFORE BREAKFAST 30 tablet 5    lisinopril (PRINIVIL;ZESTRIL) 40 MG tablet Take 1 tablet by mouth daily 30 tablet 5    NIFEdipine (PROCARDIA XL) 60 MG extended release tablet Take 1 tablet by mouth daily 30 tablet 5    omeprazole (PRILOSEC) 40 MG delayed release capsule Take 1 capsule by mouth daily 30 capsule 5    metFORMIN (GLUCOPHAGE) 1000 MG tablet Take 1 tablet by mouth 2 times daily (with meals) 60 tablet 5    simvastatin (ZOCOR) 20 MG tablet Take 1 tablet by mouth nightly (Patient not taking: Reported on 3/1/2021) 90 tablet 1     No current facility-administered medications for this visit. No Known Allergies  Family Status   Relation Name Status    Father  (Not Specified)    Mother  (Not Specified)         OBJECTIVE:  There were no vitals taken for this visit.    Wt Readings from Last 3 Encounters:   01/22/21 227 lb (103 kg)   12/09/20 219 lb (99.3 kg)   09/10/20 217 lb (98.4 kg)           Lab Results   Component Value Date    LABA1C 11.4 12/09/2020    LABA1C 7.3 12/02/2019       Constitutional: no acute distress, well appearing and well nourished  Psychiatric: oriented to person, place and time, judgement and insight and normal, recent and remote memory intact and mood and affect are normal  Skin: skin and subcutaneous tissue is normal without visible mass,   Head and Face: visual inspection  of head and face revealed no abnormalities  Eyes: visual inspection showed no lid or conjunctival swelling, erythema or discharge, pupils are normal, equal, round  Ears/Nose: external inspection of ears and nose revealed no abnormalities, hearing is grossly normal  Oropharynx/Mouth/Face: lips, tongue and gums appear  normal with no lesions, the voice quality was normal  Neck: neck appears symmetric, with no visible masses,   Pulmonary: no increased work of breathing or signs of respiratory distress,  Musculoskeletal: normal on inspection    Neurological: normal coordination and normal general cortical function    ASSESSMENT/PLAN:  1. Uncontrolled type 2 diabetes mellitus with complication, with long-term current use of insulin ([facility] [address]  Patient is noted to have significant worsening of A1c from December 2019 when his A1c was 7.32 of most recent A1c of 11  On review of chart it appears there has been a history of noncompliance with medication as well as dietary indiscretion patient said his job is very demanding and he is unable to get a lot of testing or be able to eat regular meals at regular times. I had a lengthy discussion with the patient that in order to have better glycemic control he has to take his insulin and medication at regular times and eat on somewhat regular schedule  He has not been checking his fingerstick glucose so I do not have any objective data to make recommendations I have instructed that he can come to the office to get a continuous glucose sensor inserted so we can get an idea of glycemic excursions. He has been contacted by the office to do so  Until then he will continue with the current therapy it was explained to the patient that in view of the fact that there is possible abdominal or pancreatic pathology insulin would be the best medication and I would encourage that he start basal bolus regimen. I had a lengthy discussion with the patient about  his  uncontrolled diabetes. We discussed progression of diabetes in detail and also the incidence of complications associated with uncontrolled diabetes. Jeanne Sellers was advised that lifestyle modification is the key to better control of his Diabetes. We discussed carbohydrate restriction in detail. Jeanne Sellers was advised to check his  fingerstick glucose at least 3-4 times daily. Hypoglycemia protocol reviewed in detail with patient Patient was advised to carry glucose tablets and also have glucagon emergency kit. Patient checks his fingerstick blood glucose 4-6 times a day.     Patient was advised that sending of his fingerstick blood glucose logs is crucial in management of his  diabetes. I will adjust the  doses of diabetic medications  according to sent data. Health Maintenance       Last Eye Exam: advised to have annual dilated eye exam. his last eye exam was in 2020  Last Podiatry Exam:  Last foot exam was offered by primary care physician and was refused by the patient as per chart review. Lipids: Last LDL level was 49 in December 2019, with a triglycerides of 619  Microalbumin/Creatinine Ratio: I have ordered MA with next lab work     . Education: Reviewed ABCs of diabetes management (respective goals in parentheses): A1C (<7), blood pressure (<130/80), and cholesterol (LDL <100). Additional Education: referred to Diabetes Educator. 2. Mixed hyperlipidemia/hypertriglyceridemia  Last triglycerides were 619 in December 2019 with an direct LDL of 49  Patient is historically prescribed fenofibrate's as well as statins    3. Essential hypertension  As per cardiology/primary care physician    4. Erectile dysfunction due to arterial insufficiency  Previously on medication    5. Coronary artery disease involving native coronary artery of native heart without angina pectoris  Has seen Dr. Javier Keller and/or ordered clinical lab results yes   Reviewed and/or ordered radiology tests Yes  Reviewed and/or ordered other diagnostic tests yes   Made a decision to obtain old records yes   Reviewed and summarized old records yes     Viraj Nunez was counseled regarding symptoms of current diagnosis, course and complications of disease if inadequately treated, side effects of medications, diagnosis, treatment options, and prognosis, risks, benefits, complications, and alternatives of treatment, labs, imaging and other studies and treatment targets and goals.   He understands instructions and counseling      TELEHEALTH EVALUATION -- Audio/Visual (During JWWWS-59 public health emergency)  Pursuant to the emergency declaration under the [facility] waiver authority and the [employer] Act, this Virtual  Visit was conducted, with patient's consent, to reduce the patient's risk of exposure to COVID-19 and provide care for  patient. Services were provided through a video synchronous discussion virtually to substitute for in-person clinic visit. Patient's location : home     Patient provided verbal consent to use the video visit. Total time spent : 50 +min Reviewing the chart, conducting an interview, performing a limited exam by video and educating the patient on my assessment plan. Return in about 8 weeks (around 4/26/2021). Please note that some or all of this report was generated using voice recognition software. Please notify me in case of any questions about the content of this document, as some errors in transcription may have occurred .

## 2021-03-10 ENCOUNTER — TELEPHONE (OUTPATIENT)
Dept: INTERNAL MEDICINE CLINIC | Age: 61
End: 2021-03-10

## 2021-03-10 ENCOUNTER — TELEPHONE (OUTPATIENT)
Dept: ENDOCRINOLOGY | Age: 61
End: 2021-03-10

## 2021-03-10 DIAGNOSIS — Z79.4 TYPE 2 DIABETES MELLITUS WITH DIABETIC POLYNEUROPATHY, WITH LONG-TERM CURRENT USE OF INSULIN (HCC): ICD-10-CM

## 2021-03-10 DIAGNOSIS — E11.42 TYPE 2 DIABETES MELLITUS WITH DIABETIC POLYNEUROPATHY, WITH LONG-TERM CURRENT USE OF INSULIN (HCC): ICD-10-CM

## 2021-03-10 NOTE — TELEPHONE ENCOUNTER
Left VM for patient to call office back to schedule for a Freestyle latesha pro placement and to schedule a f/u w/ Dr. Ozuna Rochelle in 2-3 months.

## 2021-03-10 NOTE — TELEPHONE ENCOUNTER
Richy Castellanos with [facility] in [place] is calling regarding electronic prescription he received for patient on metFORMIN (GLUCOPHAGE) 1000 MG tablet. He states prescription states to take 1 tablet by mouth daily and the last time patient got this prescription 2/9/21 it was for 2 tablets by mouth daily. Richy Castellanos is asking if Dr Salvadore Meckel is decreasing the dose.

## 2021-03-12 ENCOUNTER — TELEPHONE (OUTPATIENT)
Dept: INTERNAL MEDICINE CLINIC | Age: 61
End: 2021-03-12

## 2021-03-12 NOTE — TELEPHONE ENCOUNTER
I told the patient his insurance may not cover CT scan to look for pancreatic cancer based on family history.   We will look forward the recommendation from the insurance denial.

## 2021-03-15 ENCOUNTER — TELEPHONE (OUTPATIENT)
Dept: INTERNAL MEDICINE CLINIC | Age: 61
End: 2021-03-15

## 2021-03-15 ENCOUNTER — TELEPHONE (OUTPATIENT)
Dept: ENDOCRINOLOGY | Age: 61
End: 2021-03-15

## 2021-03-15 NOTE — TELEPHONE ENCOUNTER
Fax from [employer] ? Or [employer] ? Regarding a denial of the CT Abd/pelvis test that was ordered by Dr Derian Pitts.  Not of fax states they would like for Dr Morales Sit to address this.

## 2021-03-15 NOTE — TELEPHONE ENCOUNTER
Patient calling regarding a CT abd & pelvis he was supposed to have today. He states he received a voice mail from scheduling that it is cancelled due to being denied by insurance. He is asking why insurance denied and what else can be done. Patient states he usually sleep until around 2 pm due to work shift.

## 2021-03-15 NOTE — TELEPHONE ENCOUNTER
Pt would like to speak with Brigida Rather. He said he's not paying out of pocket for CT scan, states insurance needs more information for them to cover it. Please call.

## 2021-03-28 NOTE — TELEPHONE ENCOUNTER
Patient have seen [facility] and going to genetic testings.   If testing increase his risk of pancreatic cancer, I will reorder the test.

## 2021-04-05 ENCOUNTER — TELEPHONE (OUTPATIENT)
Dept: ENDOCRINOLOGY | Age: 61
End: 2021-04-05

## 2021-04-05 ENCOUNTER — OFFICE VISIT (OUTPATIENT)
Dept: INTERNAL MEDICINE CLINIC | Age: 61
End: 2021-04-05
Payer: COMMERCIAL

## 2021-04-05 VITALS
HEART RATE: 80 BPM | SYSTOLIC BLOOD PRESSURE: 138 MMHG | HEIGHT: 72 IN | DIASTOLIC BLOOD PRESSURE: 88 MMHG | WEIGHT: 226 LBS | TEMPERATURE: 96.8 F | BODY MASS INDEX: 30.61 KG/M2

## 2021-04-05 DIAGNOSIS — Z79.4 TYPE 2 DIABETES MELLITUS WITH DIABETIC POLYNEUROPATHY, WITH LONG-TERM CURRENT USE OF INSULIN (HCC): Primary | ICD-10-CM

## 2021-04-05 DIAGNOSIS — E11.42 TYPE 2 DIABETES MELLITUS WITH DIABETIC POLYNEUROPATHY, WITH LONG-TERM CURRENT USE OF INSULIN (HCC): Primary | ICD-10-CM

## 2021-04-05 PROCEDURE — 3017F COLORECTAL CA SCREEN DOC REV: CPT | Performed by: INTERNAL MEDICINE

## 2021-04-05 PROCEDURE — 1036F TOBACCO NON-USER: CPT | Performed by: INTERNAL MEDICINE

## 2021-04-05 PROCEDURE — G8427 DOCREV CUR MEDS BY ELIG CLIN: HCPCS | Performed by: INTERNAL MEDICINE

## 2021-04-05 PROCEDURE — G8417 CALC BMI ABV UP PARAM F/U: HCPCS | Performed by: INTERNAL MEDICINE

## 2021-04-05 PROCEDURE — 2022F DILAT RTA XM EVC RTNOPTHY: CPT | Performed by: INTERNAL MEDICINE

## 2021-04-05 PROCEDURE — 3046F HEMOGLOBIN A1C LEVEL >9.0%: CPT | Performed by: INTERNAL MEDICINE

## 2021-04-05 PROCEDURE — 99213 OFFICE O/P EST LOW 20 MIN: CPT | Performed by: INTERNAL MEDICINE

## 2021-04-05 ASSESSMENT — ENCOUNTER SYMPTOMS
SHORTNESS OF BREATH: 0
ABDOMINAL PAIN: 0
WHEEZING: 0
NAUSEA: 0
VOMITING: 0

## 2021-04-05 ASSESSMENT — PATIENT HEALTH QUESTIONNAIRE - PHQ9
1. LITTLE INTEREST OR PLEASURE IN DOING THINGS: 0
SUM OF ALL RESPONSES TO PHQ QUESTIONS 1-9: 0
SUM OF ALL RESPONSES TO PHQ9 QUESTIONS 1 & 2: 0

## 2021-04-05 NOTE — TELEPHONE ENCOUNTER
Returned patients call. Attempted to schedule him for a CLASEMOVIL placement. Offered all the days and times a widely available. Patient declined all appts and stated to call him back next week sometime and he will see. I explained that he can call the office back when he is ready to schedule. Patient stated \"Maybe I will or maybe I won't. \"

## 2021-04-05 NOTE — PROGRESS NOTES
Mathew Mancia  1960  male  64 y.o. SUBJECTIVE:       Chief Complaint   Patient presents with    Diabetes     12 m/l, taking levemir nightly and metformin qd--sugars are high. \"you have to find a med to fit my schedule, not yours\"       HPI:  Follow-up visit. Patient is not taking insulin as well as Metformin as advised. His blood sugar varies from 1 . His genetic test recommend routine age appropriate Cancer screening. Patient high fluctuation of blood sugar because of his noncompliance to medication. He denies abdominal pain. Past Medical History:   Diagnosis Date    Coronary artery disease involving native heart without angina pectoris 2019    Encounter for genomic assessment for malignant neoplasm risk 2021    Variant of uncertain significance (VUS). Routine f/u.  Meredith Flaherty-Genetic Care Coordinator    Erectile dysfunction 2019    Hypertension     Mixed hyperlipidemia 2019    Neuropathy     Sleep apnea      Past Surgical History:   Procedure Laterality Date    FRACTURE SURGERY       Social History     Socioeconomic History    Marital status: Single     Spouse name: None    Number of children: 1    Years of education: None    Highest education level: None   Occupational History    Occupation: [employer]   Social Needs    Financial resource strain: None    Food insecurity     Worry: None     Inability: None    Transportation needs     Medical: None     Non-medical: None   Tobacco Use    Smoking status: Former Smoker     Packs/day: 1.00     Years: 34.00     Pack years: 34.00     Types: Cigarettes     Start date: 1978     Quit date: 1/3/2012     Years since quittin.2    Smokeless tobacco: Never Used   Substance and Sexual Activity    Alcohol use: Yes     Frequency: 2-3 times a week     Comment: socially    Drug use: No    Sexual activity: Yes     Partners: Female   Lifestyle    Physical activity     Days per week: None     Minutes per session: None    Stress: None   Relationships    Social connections     Talks on phone: None     Gets together: None     Attends Mosque service: None     Active member of club or organization: None     Attends meetings of clubs or organizations: None     Relationship status: None    Intimate partner violence     Fear of current or ex partner: None     Emotionally abused: None     Physically abused: None     Forced sexual activity: None   Other Topics Concern    None   Social History Narrative    Son lives in [place]-9year old--fighting for visitation rights     Family History   Problem Relation Age of Onset    Arthritis Father     Cancer Father         pancrea cancer    Diabetes Father     Pancreatic Cancer Mother        Review of Systems   Constitutional: Negative for diaphoresis and unexpected weight change. Respiratory: Negative for shortness of breath and wheezing. Cardiovascular: Negative for chest pain and palpitations. Gastrointestinal: Negative for abdominal pain, nausea and vomiting. Neurological: Negative for dizziness and light-headedness. OBJECTIVE:  Pulse Readings from Last 4 Encounters:   04/05/21 80   01/22/21 108   12/09/20 120   09/10/20 102     Wt Readings from Last 4 Encounters:   04/05/21 226 lb (102.5 kg)   01/22/21 227 lb (103 kg)   12/09/20 219 lb (99.3 kg)   09/10/20 217 lb (98.4 kg)     BP Readings from Last 4 Encounters:   04/05/21 138/88   01/22/21 122/78   12/09/20 112/70   09/10/20 136/80     Physical Exam  Eyes:      Conjunctiva/sclera: Conjunctivae normal.   Cardiovascular:      Rate and Rhythm: Normal rate and regular rhythm. Pulses: Normal pulses. Pulmonary:      Effort: Pulmonary effort is normal.      Breath sounds: Normal breath sounds. Abdominal:      General: Bowel sounds are normal.   Musculoskeletal:      Left lower leg: No edema. Neurological:      General: No focal deficit present.       Mental Status: He is alert and oriented to person, place, and time. CBC:   Lab Results   Component Value Date    WBC 5.7 12/02/2019    HGB 12.8 12/02/2019    HCT 38.6 12/02/2019     12/02/2019     CMP:  Lab Results   Component Value Date     12/09/2020    K 4.1 12/09/2020    CL 96 12/09/2020    CO2 22 12/09/2020    ANIONGAP 18 12/09/2020    GLUCOSE 232 12/09/2020    BUN 17 12/09/2020    CREATININE 1.2 12/09/2020    GFRAA >60 12/09/2020    CALCIUM 10.2 12/09/2020    PROT 7.4 12/09/2020    LABALBU 4.5 12/09/2020    AGRATIO 1.8 12/02/2019    BILITOT 0.3 12/09/2020    ALKPHOS 92 12/09/2020    ALT 16 12/09/2020    AST <5 12/09/2020    GLOB 2.5 12/02/2019     HBA1C:   Lab Results   Component Value Date    LABA1C 11.4 12/09/2020    .5 12/09/2020     LIPID:  Lab Results   Component Value Date    CHOL 200 12/02/2019    TRIG 619 12/02/2019    HDL 83 12/02/2019    LDLCALC see below 12/02/2019    LABVLDL see below 12/02/2019     TSH:   Lab Results   Component Value Date    TSHREFLEX 2.44 12/02/2019         ASSESSMENT/PLAN:    Annie Landon was seen today for diabetes. Diagnoses and all orders for this visit:    Type 2 diabetes mellitus with diabetic polyneuropathy, with long-term current use of insulin ([address]  Uncontrolled diabetes. Significant compliance issues. He will continue to follow-up with endocrinologist.    Patient is upset that his CT abdomen was not covered by his insurance. Apparently his genetic testing for 23 pancreatic cancer gene recommended routine screening. He was given 3 times sample for FIT testing for colon cancer. He never returned any of that kit. He is not interested for colonoscopy. He is not interested in other screening testings vaccination. No orders of the defined types were placed in this encounter. Current Outpatient Medications   Medication Sig Dispense Refill    gabapentin (NEURONTIN) 300 MG capsule Take 1 capsule by mouth nightly for 180 days.  Still on 300 mg nightly------May increase to 600 mg visit. Return in about 3 months (around 7/5/2021). An After Visit Summary was printed and given to the patient. Documentation was done using voice recognition dragon software. Every effort was made to ensure accuracy; however, inadvertent  Unintentional computerized transcription errors may be present.

## 2021-04-06 ENCOUNTER — NURSE ONLY (OUTPATIENT)
Dept: ENDOCRINOLOGY | Age: 61
End: 2021-04-06
Payer: COMMERCIAL

## 2021-04-06 DIAGNOSIS — Z79.4 TYPE 2 DIABETES MELLITUS WITH DIABETIC POLYNEUROPATHY, WITH LONG-TERM CURRENT USE OF INSULIN (HCC): ICD-10-CM

## 2021-04-06 DIAGNOSIS — E11.42 TYPE 2 DIABETES MELLITUS WITH DIABETIC POLYNEUROPATHY, WITH LONG-TERM CURRENT USE OF INSULIN (HCC): ICD-10-CM

## 2021-04-06 PROCEDURE — 95250 CONT GLUC MNTR PHYS/QHP EQP: CPT | Performed by: INTERNAL MEDICINE

## 2021-04-06 NOTE — PROGRESS NOTES
Patient presents for a [facility] sensor placement. Sensor placed on left arm with no issues. Sensor activated. Patient educated to and instructed how to use. No questions or concerns at this time. Patient will return in 1 week to have it downloaded and removed.

## 2021-04-13 ENCOUNTER — NURSE ONLY (OUTPATIENT)
Dept: ENDOCRINOLOGY | Age: 61
End: 2021-04-13
Payer: COMMERCIAL

## 2021-04-13 ENCOUNTER — TELEPHONE (OUTPATIENT)
Dept: ENDOCRINOLOGY | Age: 61
End: 2021-04-13
Payer: COMMERCIAL

## 2021-04-13 DIAGNOSIS — Z79.4 TYPE 2 DIABETES MELLITUS WITH DIABETIC POLYNEUROPATHY, WITH LONG-TERM CURRENT USE OF INSULIN (HCC): Primary | ICD-10-CM

## 2021-04-13 DIAGNOSIS — E11.42 TYPE 2 DIABETES MELLITUS WITH DIABETIC POLYNEUROPATHY, WITH LONG-TERM CURRENT USE OF INSULIN (HCC): Primary | ICD-10-CM

## 2021-04-13 DIAGNOSIS — E11.42 TYPE 2 DIABETES MELLITUS WITH DIABETIC POLYNEUROPATHY, WITH LONG-TERM CURRENT USE OF INSULIN (HCC): ICD-10-CM

## 2021-04-13 DIAGNOSIS — Z79.4 TYPE 2 DIABETES MELLITUS WITH DIABETIC POLYNEUROPATHY, WITH LONG-TERM CURRENT USE OF INSULIN (HCC): ICD-10-CM

## 2021-04-13 PROCEDURE — 95251 CONT GLUC MNTR ANALYSIS I&R: CPT | Performed by: INTERNAL MEDICINE

## 2021-04-13 PROCEDURE — 99211 OFF/OP EST MAY X REQ PHY/QHP: CPT | Performed by: INTERNAL MEDICINE

## 2021-04-14 RX ORDER — INSULIN ASPART 100 [IU]/ML
INJECTION, SOLUTION INTRAVENOUS; SUBCUTANEOUS
Qty: 1 PEN | Refills: 3 | Status: SHIPPED | OUTPATIENT
Start: 2021-04-14 | End: 2021-07-13 | Stop reason: SDUPTHER

## 2021-04-14 NOTE — TELEPHONE ENCOUNTER
Diabetes Continuous Glucose Monitoring Report         Reason for Study:     - improve diabetic control without risk of hypoglycemia       Current Medication regimen:        CGMS Report   CGMS insertion date April 6, 2021  CGMS data collection was performed on April 12, 2021  Patient provided information on his  diet, activities and insulin dosing  during this period. Data was available for 6.5 days     Sensor Data Report:   - 12 AM to 6 AM: Overnight blood glucose pattern shows fluctuation in glucose based on postprandial high numbers   - 6   AM to 10 AM:  Post breakfast hyperglycemia is noted  --10AM to 5 PM : No hypoglycemia observed during this time. - 5   PM to 8 PM: Post meal hyperglycemia is noted       Average reading 183 mg/dL   Coefficient of variation 38.8  % of time <70 mg/dL 0 %   % of time >180  mg/dL 50%   % of time within range 50%  Number of hypoglycemia episodes noted: 0     Impression:   CGMS shows wide fluctuations in glucose based on food intake     Recommendation:      Patient was advised to make the following changes in his diabetic regimen  Stay on the same dose of long-acting insulin  My recommendations are to start taking short acting insulin with dinner 10 minutes prior to eating. Reduce the amount of carbohydrates with dinner to 40 g or less.

## 2021-04-14 NOTE — TELEPHONE ENCOUNTER
Left VM for patient with information per patient request d/t his work hours and sleep. Patient to call office back with any questions or concerns. Rx sent to pharmacy.

## 2021-04-15 ENCOUNTER — TELEPHONE (OUTPATIENT)
Dept: ENDOCRINOLOGY | Age: 61
End: 2021-04-15

## 2021-04-15 DIAGNOSIS — E11.42 TYPE 2 DIABETES MELLITUS WITH DIABETIC POLYNEUROPATHY, WITH LONG-TERM CURRENT USE OF INSULIN (HCC): Primary | ICD-10-CM

## 2021-04-15 DIAGNOSIS — Z79.4 TYPE 2 DIABETES MELLITUS WITH DIABETIC POLYNEUROPATHY, WITH LONG-TERM CURRENT USE OF INSULIN (HCC): Primary | ICD-10-CM

## 2021-04-15 RX ORDER — INSULIN LISPRO 100 [IU]/ML
INJECTION, SOLUTION INTRAVENOUS; SUBCUTANEOUS
Qty: 1 PEN | Refills: 3 | Status: SHIPPED | OUTPATIENT
Start: 2021-04-15 | End: 2021-06-01

## 2021-04-19 DIAGNOSIS — E78.2 MIXED HYPERLIPIDEMIA: ICD-10-CM

## 2021-04-19 DIAGNOSIS — E11.42 TYPE 2 DIABETES MELLITUS WITH DIABETIC POLYNEUROPATHY, WITH LONG-TERM CURRENT USE OF INSULIN (HCC): ICD-10-CM

## 2021-04-19 DIAGNOSIS — Z79.4 TYPE 2 DIABETES MELLITUS WITH DIABETIC POLYNEUROPATHY, WITH LONG-TERM CURRENT USE OF INSULIN (HCC): ICD-10-CM

## 2021-04-19 RX ORDER — FENOFIBRATE 145 MG/1
TABLET, COATED ORAL
Qty: 90 TABLET | Refills: 1 | Status: SHIPPED | OUTPATIENT
Start: 2021-04-19

## 2021-05-10 ENCOUNTER — TELEPHONE (OUTPATIENT)
Dept: ENDOCRINOLOGY | Age: 61
End: 2021-05-10

## 2021-05-10 NOTE — TELEPHONE ENCOUNTER
Returned patients call. He has been taking the Levemir 30 units twice daily so he will increase it to 40 units twice daily. He will take 12 units with each meal. He will send us his sugar logs or will bring in his meter for us to download. No other questions or concerns at this time.

## 2021-05-10 NOTE — TELEPHONE ENCOUNTER
Returned patients call. He is asking about the Dexcom. I explained that Tamir Riaz has left him a voicemail regarding the Dexcom. Patient denies any voicemail. I explained I will have Darlene reach out to the patient again. Patient is currently taking 12 units with dinner only. His sugars have been running in the 400's. He would like to know what he should do.

## 2021-05-10 NOTE — TELEPHONE ENCOUNTER
Pt is calling regarding ?? ? ? Dexcom   He is concerned is Sugar are running high 400.   Please call pt back

## 2021-05-12 NOTE — TELEPHONE ENCOUNTER
Patient called and stated that Dr. Argelia Lui told him to take Levemir 30 units twice daily so he did that but now he has changed it to 50 units once daily on his own because he does not like injecting himself that many times daily. His sugars have come down from the 400's but are still running around 240. He wants to know if he can take more short acting insulin. Patient also stated he does not like the Novolog flexpens because they are too hard for him to push. I explained that the other option is syringes but that would be more difficult.

## 2021-05-12 NOTE — TELEPHONE ENCOUNTER
He can increase his short acting insulin based on his glucose levels --- he can send us his glucose logs for further adjustment

## 2021-05-19 ENCOUNTER — TELEPHONE (OUTPATIENT)
Dept: ENDOCRINOLOGY | Age: 61
End: 2021-05-19

## 2021-05-19 NOTE — TELEPHONE ENCOUNTER
Patient needs to send me his glucose logs and also his current doses of insulin, and also what time does he takes insulin so I can make appropriate adjustments he would most likely need higher doses of insulin once I find out the details
Pt calling back would like to talk to nurse regarding his sugars. He states last time he talked to nurse his sugars have not been under the 300 range.   CB# [phone]
3 = unable to understand or speak (not related to language barrier)

## 2021-05-24 ENCOUNTER — TELEPHONE (OUTPATIENT)
Dept: ENDOCRINOLOGY | Age: 61
End: 2021-05-24

## 2021-05-24 NOTE — TELEPHONE ENCOUNTER
Pt phoned states he received a approval letter he is not real sure what it is for.   He would like someone to call him and explain it to him

## 2021-05-24 NOTE — TELEPHONE ENCOUNTER
Returned patients call. He stated the letter said his sensor was approved. I informed him that it is probably the approval for his Dexcom sensor and he should be looking out for a call from [id number]. Patient verbalized understanding. No other questions or concerns at this time.

## 2021-06-10 ENCOUNTER — TELEPHONE (OUTPATIENT)
Dept: ENDOCRINOLOGY | Age: 61
End: 2021-06-10

## 2021-06-10 NOTE — TELEPHONE ENCOUNTER
Please download his freestyle latesha data, and if he wants to increase his meal boluses of insulin he can increase it by 2 to 4 units I still have to look at his glucose logs

## 2021-06-10 NOTE — TELEPHONE ENCOUNTER
Patient aware. He will come to the office next week on Tuesday, Wednesday, or Thursday to have his Dexcom downloaded.

## 2021-06-17 ENCOUNTER — TELEPHONE (OUTPATIENT)
Dept: ENDOCRINOLOGY | Age: 61
End: 2021-06-17

## 2021-06-17 RX ORDER — LANOLIN ALCOHOL/MO/W.PET/CERES
CREAM (GRAM) TOPICAL
Qty: 30 TABLET | Refills: 5 | Status: SHIPPED | OUTPATIENT
Start: 2021-06-17

## 2021-07-13 ENCOUNTER — OFFICE VISIT (OUTPATIENT)
Dept: ENDOCRINOLOGY | Age: 61
End: 2021-07-13
Payer: COMMERCIAL

## 2021-07-13 VITALS
BODY MASS INDEX: 31.76 KG/M2 | DIASTOLIC BLOOD PRESSURE: 73 MMHG | WEIGHT: 234.2 LBS | SYSTOLIC BLOOD PRESSURE: 137 MMHG | HEART RATE: 118 BPM

## 2021-07-13 DIAGNOSIS — I10 ESSENTIAL HYPERTENSION: ICD-10-CM

## 2021-07-13 DIAGNOSIS — E11.42 TYPE 2 DIABETES MELLITUS WITH DIABETIC POLYNEUROPATHY, WITH LONG-TERM CURRENT USE OF INSULIN (HCC): Primary | ICD-10-CM

## 2021-07-13 DIAGNOSIS — Z79.4 TYPE 2 DIABETES MELLITUS WITH DIABETIC POLYNEUROPATHY, WITH LONG-TERM CURRENT USE OF INSULIN (HCC): Primary | ICD-10-CM

## 2021-07-13 DIAGNOSIS — E78.2 MIXED HYPERLIPIDEMIA: ICD-10-CM

## 2021-07-13 PROCEDURE — 3017F COLORECTAL CA SCREEN DOC REV: CPT | Performed by: INTERNAL MEDICINE

## 2021-07-13 PROCEDURE — 95251 CONT GLUC MNTR ANALYSIS I&R: CPT | Performed by: INTERNAL MEDICINE

## 2021-07-13 PROCEDURE — G8427 DOCREV CUR MEDS BY ELIG CLIN: HCPCS | Performed by: INTERNAL MEDICINE

## 2021-07-13 PROCEDURE — 1036F TOBACCO NON-USER: CPT | Performed by: INTERNAL MEDICINE

## 2021-07-13 PROCEDURE — 99214 OFFICE O/P EST MOD 30 MIN: CPT | Performed by: INTERNAL MEDICINE

## 2021-07-13 PROCEDURE — G8417 CALC BMI ABV UP PARAM F/U: HCPCS | Performed by: INTERNAL MEDICINE

## 2021-07-13 PROCEDURE — 2022F DILAT RTA XM EVC RTNOPTHY: CPT | Performed by: INTERNAL MEDICINE

## 2021-07-13 PROCEDURE — 3046F HEMOGLOBIN A1C LEVEL >9.0%: CPT | Performed by: INTERNAL MEDICINE

## 2021-07-13 RX ORDER — INSULIN DETEMIR 100 [IU]/ML
INJECTION, SOLUTION SUBCUTANEOUS
Qty: 5 PEN | Refills: 5 | Status: SHIPPED | OUTPATIENT
Start: 2021-07-13

## 2021-07-13 RX ORDER — INSULIN ASPART 100 [IU]/ML
INJECTION, SOLUTION INTRAVENOUS; SUBCUTANEOUS
Qty: 5 PEN | Refills: 5 | Status: SHIPPED | OUTPATIENT
Start: 2021-07-13 | End: 2021-07-13

## 2021-07-13 RX ORDER — INSULIN DETEMIR 100 [IU]/ML
50 INJECTION, SOLUTION SUBCUTANEOUS NIGHTLY
Qty: 5 PEN | Refills: 5 | Status: SHIPPED | OUTPATIENT
Start: 2021-07-13 | End: 2021-07-13 | Stop reason: SDUPTHER

## 2021-07-13 NOTE — PATIENT INSTRUCTIONS
GOALS - 1. HBA1C (3MONTH AVG) SHOULD BE LESS THAN 6.5     PLEASE CHECK YOUR SUGARS:   BEFORE BREAKFAST  BEFORE LUNCH  BEFORE DINNER  BEDTIME  AFTER MEALS    2. FINGERSTICKS SHOULD BE   BEFORE MEALS <   AFTER MEALS < 140   BEDTIME >100     3. CARBOHYDRATES  MEALS 40--60 G  SNACKS 15  20 G    4.  BLOOD PRESSURE  < 130/80    --- levemir 55  units nightly and increase dose by 2 units every 3 days until fasting blood sugar are below 120     ---Start taking short acting insulin (humalog/novolog) according to carbohydrate to insulin ratio 5 gm of carb = 1 unit of short acting insulin,                                        +    Sliding Scale Insulin for short acting insulin   If BS > 120 -150 take 2 additional units of fast actinginsulin   if --180 take 4 units   181-210 take 6 Units  211-240 take 8 Units   241--270 take 10 Units   271- 300 take 12 Units

## 2021-07-13 NOTE — PROGRESS NOTES
Zackary Mc is a 64 y.o. male is seen  evaluation and management of Type 2  diabetes. Zackary Mc was diagnosed with Diabetes mellitus in 2007 . At the time of diagnosis patient had numbness and tinlgling in his fingers he also has polyuria . Zackary Mc got diabetic education in the past   Comorbid conditions: Neuropathy, Retinopathy and Coronary Artery Disease  Diabetes control was 7.3 in dec 2019 to 11.4 in dec 2020   Parents thinks he had pancreatic cancer , he has been staying extremely fatigued and has not completed the work-up for abdominal issues as he is busy at work. He has been diagnosed with essential hypertension and is on medication denies any chest pain or shortness of breath  He also has hyperlipidemia and is on statins as well as fenofibrate's  He has coronary artery disease and had previously seen Dr. Marco A Lane and as per chart review had refused cardiac catheterization    INTERIM:    Diabetes  He presents for his follow-up diabetic visit. He has type 2 diabetes mellitus. No MedicAlert identification noted. The initial diagnosis of diabetes was made 14 years ago. Previously was a schoolteacher  Pt tells me that he is always angry and he likes to curse ---  Denies any hypoglycemia but glucose control has improved--- today he tells me that he is in a hurry as he has to go to a bank to get his money that they have been hiding. On review of notes as per primary care physician patient refused foot exam or preventive vaccination  Patient did tell me that he is certain that he has pancreatic cancer as his mom also had pancreatic cancer. But he has not done the imaging that was ordered for him. I did stress to him that he needs to get his CT scan of abdomen done at his first virtual visit with me, he told me that his schedule is extremely busy and he is unable to get most of the testing done.   Currently takes 50 units of Levemir compliance is questionable, dietary indiscretion is noted Patient is currently taking the following medications for diabetes invokana , metformin and levemir   I reviewed his chart and there was a recent worsening of A1c in the last 12 to 18 months    Past Medical History:   Diagnosis Date    Coronary artery disease involving native heart without angina pectoris 2019    Encounter for genomic assessment for malignant neoplasm risk 2021    Variant of uncertain significance (VUS). Routine f/u.  Meredith Flaherty-Genetic Care Coordinator    Erectile dysfunction 2019    Hypertension     Mixed hyperlipidemia 2019    Neuropathy     Sleep apnea       Patient Active Problem List   Diagnosis    Abnormal ECG    Abnormal nuclear stress test    Type 2 diabetes mellitus with diabetic polyneuropathy, with long-term current use of insulin (HCC)    Essential hypertension    Coronary artery disease involving native heart without angina pectoris    Erectile dysfunction    Fatigue    Mixed hyperlipidemia    Vitamin B12 deficiency    Folate deficiency    Vitamin D deficiency     Past Surgical History:   Procedure Laterality Date    FRACTURE SURGERY       Social History     Socioeconomic History    Marital status: Single     Spouse name: Not on file    Number of children: 1    Years of education: Not on file    Highest education level: Not on file   Occupational History    Occupation: [employer]   Tobacco Use    Smoking status: Former Smoker     Packs/day: 1.00     Years: 34.00     Pack years: 34.00     Types: Cigarettes     Start date: 1978     Quit date: 1/3/2012     Years since quittin.5    Smokeless tobacco: Never Used   Vaping Use    Vaping Use: Former   Substance and Sexual Activity    Alcohol use: Yes     Comment: socially    Drug use: No    Sexual activity: Yes     Partners: Female   Other Topics Concern    Not on file   Social History Narrative    Son lives in [place]-9year old--fighting for visitation rights     Social Determinants of Health     Financial Resource Strain:     Difficulty of Paying Living Expenses:    Food Insecurity:     Worried About Running Out of Food in the Last Year:     [address] in the Last Year:    Transportation Needs:     Lack of Transportation (Medical):  Lack of Transportation (Non-Medical):    Physical Activity:     Days of Exercise per Week:     Minutes of Exercise per Session:    Stress:     Feeling of Stress :    Social Connections:     Frequency of Communication with Friends and Family:     Frequency of Social Gatherings with Friends and Family:     Attends Congregation Services:     Active Member of Clubs or Organizations:     Attends Club or Organization Meetings:     Marital Status:    Intimate Partner Violence:     Fear of Current or Ex-Partner:     Emotionally Abused:     Physically Abused:     Sexually Abused:      Family History   Problem Relation Age of Onset    Arthritis Father     Cancer Father         pancrea cancer    Diabetes Father     Pancreatic Cancer Mother        No Known Allergies  Family Status   Relation Name Status    Father  (Not Specified)    Mother  (Not Specified)         OBJECTIVE:  /73   Pulse 118   Wt 234 lb 3.2 oz (106.2 kg)   BMI 31.76 kg/m²    Wt Readings from Last 3 Encounters:   07/13/21 234 lb 3.2 oz (106.2 kg)   04/05/21 226 lb (102.5 kg)   01/22/21 227 lb (103 kg)     ROS  I have reviewed the review of system questionnaire filled by the patient .   Patient was advised to contact PCP for non endocrine signs and symptoms       Lab Results   Component Value Date    LABA1C 11.4 12/09/2020    LABA1C 7.3 12/02/2019       Constitutional: no acute distress, well appearing and well nourished  Psychiatric: oriented to person, place and time, judgement and insight and normal, recent and remote memory intact and mood and affect are normal  Skin: skin and subcutaneous tissue is normal without visible mass,   Head and Face: visual inspection  of head and face revealed no abnormalities  Eyes: visual inspection showed no lid or conjunctival swelling, erythema or discharge, pupils are normal, equal, round  Ears/Nose: external inspection of ears and nose revealed no abnormalities, hearing is grossly normal  Oropharynx/Mouth/Face: lips, tongue and gums appear  normal with no lesions, the voice quality was normal  Neck: neck appears symmetric, with no visible masses,   Pulmonary: no increased work of breathing or signs of respiratory distress,  Musculoskeletal: normal on inspection    Neurological: normal coordination and normal general cortical function    ASSESSMENT/PLAN:    ---- Uncontrolled type 2 diabetes mellitus with complication, with long-term current use of insulin   Works during night   Patient is noted to have significant worsening of A1c from December 2019 when his A1c was 7.32 >> A1c of 11  Now wears DEXCOM   He is currently taking levemir 50 units daily   Increase levemir to 60 units as fasting glucose still running high  Change insulin with meals CIR 5:1   He takes Invokana 300 mg daily   Metformin 1000 mg daily    I had a lengthy discussion with the patient about  his  uncontrolled diabetes. We discussed progression of diabetes in detail and also the incidence of complications associated with uncontrolled diabetes. Randa Zamudio was advised that lifestyle modification is the key to better control of his Diabetes. We discussed carbohydrate restriction in detail. Randa Zamudio was advised to check his  fingerstick glucose at least 3-4 times daily. Hypoglycemia protocol reviewed in detail with patient Patient was advised to carry glucose tablets and also have glucagon emergency kit. Patient was advised that sending of his fingerstick blood glucose logs is crucial in management of his  diabetes. I will adjust the  doses of diabetic medications  according to sent data.      Health Maintenance       Last Eye Exam: advised to have annual dilated eye exam. his last eye exam was in 2020  Last Podiatry Exam:  Last foot exam was offered by primary care physician and was refused by the patient as per chart review. Lipids: Last LDL level was 49 in December 2019, with a triglycerides of 619  Microalbumin/Creatinine Ratio: I have ordered MA with next lab work     . Education: Reviewed ABCs of diabetes management (respective goals in parentheses): A1C (<7), blood pressure (<130/80), and cholesterol (LDL <100). Additional Education: referred to Diabetes Educator. 2. Mixed hyperlipidemia/hypertriglyceridemia  Last triglycerides were 619 in December 2019 with an direct LDL of 49  Patient is historically prescribed fenofibrate's as well as statins    3. Essential hypertension  As per cardiology/primary care physician    4. Erectile dysfunction due to arterial insufficiency  Previously on medication    5. Coronary artery disease involving native coronary artery of native heart without angina pectoris  Has seen Dr. Jesus Manuel Card and/or ordered clinical lab results yes   Reviewed and/or ordered radiology tests Yes  Reviewed and/or ordered other diagnostic tests yes   Made a decision to obtain old records yes   Reviewed and summarized old records yes     Marifer Morris was counseled regarding symptoms of current diagnosis, course and complications of disease if inadequately treated, side effects of medications, diagnosis, treatment options, and prognosis, risks, benefits, complications, and alternatives of treatment, labs, imaging and other studies and treatment targets and goals.   He understands instructions and counseling       Diabetes Continuous Glucose Monitoring Report         Reason for Study:     - improve diabetic control without risk of hypoglycemia       Current Medication regimen:   Basal bolus regimen      CGMS Report     CGMS data collection was performed on   Patient provided information on his  diet, activities and insulin dosing

## 2021-07-15 RX ORDER — ERGOCALCIFEROL 1.25 MG/1
CAPSULE ORAL
Qty: 4 CAPSULE | Refills: 5 | Status: SHIPPED | OUTPATIENT
Start: 2021-07-15

## 2021-08-17 RX ORDER — FOLIC ACID 1 MG/1
TABLET ORAL
Qty: 30 TABLET | Refills: 5 | Status: SHIPPED | OUTPATIENT
Start: 2021-08-17

## 2021-09-15 DIAGNOSIS — Z79.4 TYPE 2 DIABETES MELLITUS WITH DIABETIC POLYNEUROPATHY, WITH LONG-TERM CURRENT USE OF INSULIN (HCC): ICD-10-CM

## 2021-09-15 DIAGNOSIS — E11.42 TYPE 2 DIABETES MELLITUS WITH DIABETIC POLYNEUROPATHY, WITH LONG-TERM CURRENT USE OF INSULIN (HCC): ICD-10-CM

## 2021-09-15 RX ORDER — OMEPRAZOLE 40 MG/1
CAPSULE, DELAYED RELEASE ORAL
Qty: 30 CAPSULE | Refills: 0 | Status: SHIPPED | OUTPATIENT
Start: 2021-09-15

## 2021-09-15 RX ORDER — NIFEDIPINE 60 MG/1
TABLET, FILM COATED, EXTENDED RELEASE ORAL
Qty: 30 TABLET | Refills: 0 | Status: SHIPPED | OUTPATIENT
Start: 2021-09-15

## 2021-09-15 RX ORDER — GABAPENTIN 300 MG/1
CAPSULE ORAL
Qty: 60 CAPSULE | Refills: 0 | Status: SHIPPED | OUTPATIENT
Start: 2021-09-15 | End: 2021-10-15

## 2021-09-23 ENCOUNTER — TELEPHONE (OUTPATIENT)
Dept: INTERNAL MEDICINE CLINIC | Age: 61
End: 2021-09-23

## 2021-09-23 NOTE — TELEPHONE ENCOUNTER
Jerome Pérez from [facility] Office calling to let Dr. Syeda Santos know Scott Atkinson was found  last night in his home. The cause of death was from natural causes and believed to be from Hypertension or Type 2 Diabetes. Bisi Stinson stated either can be put on the death certificate. The patient is at [facility] and the  home will be bringing over the death certificate for Dr. Syeda Santos to sign.

## 2021-10-15 DIAGNOSIS — E11.42 TYPE 2 DIABETES MELLITUS WITH DIABETIC POLYNEUROPATHY, WITH LONG-TERM CURRENT USE OF INSULIN (HCC): ICD-10-CM

## 2021-10-15 DIAGNOSIS — Z79.4 TYPE 2 DIABETES MELLITUS WITH DIABETIC POLYNEUROPATHY, WITH LONG-TERM CURRENT USE OF INSULIN (HCC): ICD-10-CM

## 2021-10-15 DIAGNOSIS — E78.2 MIXED HYPERLIPIDEMIA: ICD-10-CM

## 2021-10-15 RX ORDER — GABAPENTIN 300 MG/1
CAPSULE ORAL
Qty: 60 CAPSULE | Refills: 0 | OUTPATIENT
Start: 2021-10-15

## 2021-10-15 RX ORDER — OMEPRAZOLE 40 MG/1
CAPSULE, DELAYED RELEASE ORAL
Qty: 30 CAPSULE | Refills: 0 | OUTPATIENT
Start: 2021-10-15

## 2021-10-15 RX ORDER — FENOFIBRATE 145 MG/1
TABLET, COATED ORAL
Qty: 30 TABLET | Refills: 1 | OUTPATIENT
Start: 2021-10-15

## 2021-10-15 RX ORDER — NIFEDIPINE 60 MG/1
TABLET, FILM COATED, EXTENDED RELEASE ORAL
Qty: 30 TABLET | Refills: 0 | OUTPATIENT
Start: 2021-10-15

## 2022-02-25 ENCOUNTER — TELEPHONE (OUTPATIENT)
Dept: ENDOCRINOLOGY | Age: 62
End: 2022-02-25

## 2022-02-25 NOTE — TELEPHONE ENCOUNTER
Fax from [facility] w/ forms for diabetes supplies    Faxed back to [facility] stating the pt has passed away

## 2023-02-11 NOTE — Clinical Note
Patient needs a freestyle pro sensor to be placed in our office if you we have time tomorrow to do it he would like to come over to the office and get it done he will keep a food journal and return in a week for us to download the sensor please schedule this with the patient.   Hopefully you are covering me tomorrow too, otherwise patient needs a follow-up in 2 to 3 months and I have placed lab orders for now and for 2 to 3 months
room air

## 2024-04-05 NOTE — TELEPHONE ENCOUNTER
Anais:  Please schedule Robotic assisted bronchoscopy with anesthesia with the use of cone beam fluoroscopy under anesthesia to be used for biopsies of the Right middle lobe lesion.  Endobronchial ultrasound MAYBE used for lymph node sampling, site to be determined using real time assessment.     Clair knows you will be calling. No blood thinners. Orders placed.    Please see other note.   He need to pay out of pocket since his insurance would not cover